# Patient Record
Sex: FEMALE | Race: WHITE | Employment: UNEMPLOYED | URBAN - METROPOLITAN AREA
[De-identification: names, ages, dates, MRNs, and addresses within clinical notes are randomized per-mention and may not be internally consistent; named-entity substitution may affect disease eponyms.]

---

## 2019-08-15 ENCOUNTER — OFFICE VISIT (OUTPATIENT)
Dept: OBGYN CLINIC | Facility: CLINIC | Age: 34
End: 2019-08-15
Payer: COMMERCIAL

## 2019-08-15 ENCOUNTER — TELEPHONE (OUTPATIENT)
Dept: OBGYN CLINIC | Facility: CLINIC | Age: 34
End: 2019-08-15

## 2019-08-15 VITALS
HEART RATE: 70 BPM | SYSTOLIC BLOOD PRESSURE: 106 MMHG | BODY MASS INDEX: 19.33 KG/M2 | DIASTOLIC BLOOD PRESSURE: 68 MMHG | HEIGHT: 64 IN | WEIGHT: 113.25 LBS

## 2019-08-15 DIAGNOSIS — N92.6 MISSED MENSES: Primary | ICD-10-CM

## 2019-08-15 LAB
CONTROL LINE PRESENT WITH A CLEAR BACKGROUND (YES/NO): YES YES/NO
KIT LOT #: NORMAL NUMERIC
PREGNANCY TEST, URINE: POSITIVE

## 2019-08-15 PROCEDURE — 99202 OFFICE O/P NEW SF 15 MIN: CPT | Performed by: ADVANCED PRACTICE MIDWIFE

## 2019-08-15 PROCEDURE — 81025 URINE PREGNANCY TEST: CPT | Performed by: ADVANCED PRACTICE MIDWIFE

## 2019-08-15 NOTE — TELEPHONE ENCOUNTER
pt. requesting to get order to get Ultrasound changed to [facility]. Please call when order is changed.

## 2019-08-15 NOTE — TELEPHONE ENCOUNTER
Spoke w/ Danilo Norman central scheduling. Pt can schedule w/ either Danilo Norman or Marydel. She will need to completely cancel her [place] appt & start a new appt w/ Danilo Norman. A new order is not needed. Called pt & instructed on scheduling.  Pt verbalized an unders

## 2019-08-15 NOTE — PROGRESS NOTES
Maxx Jaquez is a 30year old , current EGA of Unknown presents for amenorrhea. Reports LMP as uncertain with  Irregular cycles and is breastfeeding 3year old  Believes conceived 19.     This is a unplanned pregnancy and patient is excited

## 2019-08-16 ENCOUNTER — TELEPHONE (OUTPATIENT)
Dept: OBGYN CLINIC | Facility: CLINIC | Age: 34
End: 2019-08-16

## 2019-08-16 NOTE — TELEPHONE ENCOUNTER
Pt would like to schedule her Nurse education, didn't see in the notes when she was suppose to return.  Please advise

## 2019-08-16 NOTE — TELEPHONE ENCOUNTER
NAWAF Myrick, RN   Caller: Unspecified (Today,  8:14 AM)             Yes, schedule anytime,and please find out if she signsed ROIs for her previous births at [address] office yesterday.    If not, we need to get those signed at

## 2019-08-17 PROBLEM — O09.299 HISTORY OF POSTPARTUM HEMORRHAGE, CURRENTLY PREGNANT: Status: ACTIVE | Noted: 2019-08-17

## 2019-08-17 PROBLEM — O09.00 PREGNANCY WITH HISTORY OF INFERTILITY, ANTEPARTUM (HCC): Status: ACTIVE | Noted: 2019-08-17

## 2019-08-17 PROBLEM — O09.00 PREGNANCY WITH HISTORY OF INFERTILITY, ANTEPARTUM: Status: ACTIVE | Noted: 2019-08-17

## 2019-08-17 PROBLEM — Z98.891 HISTORY OF VAGINAL BIRTH AFTER CESAREAN: Status: ACTIVE | Noted: 2019-08-17

## 2019-08-17 PROBLEM — O09.299 HISTORY OF POSTPARTUM HEMORRHAGE, CURRENTLY PREGNANT (HCC): Status: ACTIVE | Noted: 2019-08-17

## 2019-08-19 ENCOUNTER — HOSPITAL ENCOUNTER (OUTPATIENT)
Dept: ULTRASOUND IMAGING | Age: 34
Discharge: HOME | End: 2019-08-19
Attending: ADVANCED PRACTICE MIDWIFE
Payer: COMMERCIAL

## 2019-08-19 DIAGNOSIS — N92.6 MISSED MENSES: ICD-10-CM

## 2019-08-19 DIAGNOSIS — N92.6 MISSED PERIODS: ICD-10-CM

## 2019-08-19 PROCEDURE — 76817 TRANSVAGINAL US OBSTETRIC: CPT | Performed by: ADVANCED PRACTICE MIDWIFE

## 2019-08-19 PROCEDURE — 76801 OB US < 14 WKS SINGLE FETUS: CPT | Performed by: ADVANCED PRACTICE MIDWIFE

## 2019-08-19 PROCEDURE — 93975 VASCULAR STUDY: CPT | Performed by: ADVANCED PRACTICE MIDWIFE

## 2019-08-21 ENCOUNTER — NURSE ONLY (OUTPATIENT)
Dept: OBGYN CLINIC | Facility: CLINIC | Age: 34
End: 2019-08-21
Payer: COMMERCIAL

## 2019-08-21 VITALS — WEIGHT: 115.19 LBS | BODY MASS INDEX: 20 KG/M2

## 2019-08-21 DIAGNOSIS — Z3A.12 12 WEEKS GESTATION OF PREGNANCY: Primary | ICD-10-CM

## 2019-08-21 RX ORDER — CHOLECALCIFEROL (VITAMIN D3) 25 MCG
1 TABLET,CHEWABLE ORAL DAILY
COMMUNITY
End: 2019-11-21

## 2019-08-21 NOTE — PROGRESS NOTES
Pt is here today for OB RN education visit, educational material reviewed. Pt verbalized understanding. Orders placed for NOB labs including HCV.  Pt has a hx of  for preeclampsia with twin pregnancy, pt signed DYLON for previous PN records and OP re

## 2019-08-23 ENCOUNTER — TELEPHONE (OUTPATIENT)
Dept: OBGYN CLINIC | Facility: CLINIC | Age: 34
End: 2019-08-23

## 2019-08-26 ENCOUNTER — TELEPHONE (OUTPATIENT)
Dept: OBGYN CLINIC | Facility: CLINIC | Age: 34
End: 2019-08-26

## 2019-08-26 ENCOUNTER — LAB ENCOUNTER (OUTPATIENT)
Dept: LAB | Age: 34
End: 2019-08-26
Attending: ADVANCED PRACTICE MIDWIFE
Payer: COMMERCIAL

## 2019-08-26 DIAGNOSIS — Z3A.12 12 WEEKS GESTATION OF PREGNANCY: ICD-10-CM

## 2019-08-26 LAB
ANTIBODY SCREEN: POSITIVE
BASOPHILS # BLD AUTO: 0.03 X10(3) UL (ref 0–0.2)
BASOPHILS NFR BLD AUTO: 0.3 %
C ANTIGEN: POSITIVE
DEPRECATED RDW RBC AUTO: 42.7 FL (ref 35.1–46.3)
DIRECT COOMBS POLY: NEGATIVE
E ANTIGEN: NEGATIVE
EOSINOPHIL # BLD AUTO: 0.09 X10(3) UL (ref 0–0.7)
EOSINOPHIL NFR BLD AUTO: 0.9 %
ERYTHROCYTE [DISTWIDTH] IN BLOOD BY AUTOMATED COUNT: 14.3 % (ref 11–15)
EST. AVERAGE GLUCOSE BLD GHB EST-MCNC: 100 MG/DL (ref 68–126)
FOLATE SERPL-MCNC: >20 NG/ML (ref 8.7–?)
HBA1C MFR BLD HPLC: 5.1 % (ref ?–5.7)
HBV SURFACE AG SER-ACNC: 0.25 [IU]/L
HBV SURFACE AG SERPL QL IA: NONREACTIVE
HCT VFR BLD AUTO: 38.2 % (ref 35–48)
HCV AB SERPL QL IA: NONREACTIVE
HGB BLD-MCNC: 12.8 G/DL (ref 12–16)
IMM GRANULOCYTES # BLD AUTO: 0.04 X10(3) UL (ref 0–1)
IMM GRANULOCYTES NFR BLD: 0.4 %
LITTLE C ANTIGEN: NEGATIVE
LITTLE E ANTIGEN: POSITIVE
LYMPHOCYTES # BLD AUTO: 1.89 X10(3) UL (ref 1–4)
LYMPHOCYTES NFR BLD AUTO: 18.3 %
MCH RBC QN AUTO: 27.5 PG (ref 26–34)
MCHC RBC AUTO-ENTMCNC: 33.5 G/DL (ref 31–37)
MCV RBC AUTO: 82 FL (ref 80–100)
MONOCYTES # BLD AUTO: 0.56 X10(3) UL (ref 0.1–1)
MONOCYTES NFR BLD AUTO: 5.4 %
NEUTROPHILS # BLD AUTO: 7.73 X10 (3) UL (ref 1.5–7.7)
NEUTROPHILS # BLD AUTO: 7.73 X10(3) UL (ref 1.5–7.7)
NEUTROPHILS NFR BLD AUTO: 74.7 %
PLATELET # BLD AUTO: 264 10(3)UL (ref 150–450)
RBC # BLD AUTO: 4.66 X10(6)UL (ref 3.8–5.3)
RH BLOOD TYPE: POSITIVE
RUBV IGG SER QL: POSITIVE
RUBV IGG SER-ACNC: 62.4 IU/ML (ref 10–?)
VIT B12 SERPL-MCNC: 590 PG/ML (ref 193–986)
WBC # BLD AUTO: 10.3 X10(3) UL (ref 4–11)

## 2019-08-26 PROCEDURE — 86850 RBC ANTIBODY SCREEN: CPT

## 2019-08-26 PROCEDURE — 82607 VITAMIN B-12: CPT

## 2019-08-26 PROCEDURE — 86886 COOMBS TEST INDIRECT TITER: CPT

## 2019-08-26 PROCEDURE — 83036 HEMOGLOBIN GLYCOSYLATED A1C: CPT

## 2019-08-26 PROCEDURE — 86803 HEPATITIS C AB TEST: CPT

## 2019-08-26 PROCEDURE — 86077 PHYS BLOOD BANK SERV XMATCH: CPT

## 2019-08-26 PROCEDURE — 86905 BLOOD TYPING RBC ANTIGENS: CPT

## 2019-08-26 PROCEDURE — 36415 COLL VENOUS BLD VENIPUNCTURE: CPT

## 2019-08-26 PROCEDURE — 86901 BLOOD TYPING SEROLOGIC RH(D): CPT

## 2019-08-26 PROCEDURE — 86880 COOMBS TEST DIRECT: CPT

## 2019-08-26 PROCEDURE — 86870 RBC ANTIBODY IDENTIFICATION: CPT

## 2019-08-26 PROCEDURE — 86900 BLOOD TYPING SEROLOGIC ABO: CPT

## 2019-08-26 PROCEDURE — 85025 COMPLETE CBC W/AUTO DIFF WBC: CPT

## 2019-08-26 PROCEDURE — 87086 URINE CULTURE/COLONY COUNT: CPT

## 2019-08-26 PROCEDURE — 87389 HIV-1 AG W/HIV-1&-2 AB AG IA: CPT

## 2019-08-26 PROCEDURE — 86762 RUBELLA ANTIBODY: CPT

## 2019-08-26 PROCEDURE — 86780 TREPONEMA PALLIDUM: CPT

## 2019-08-26 PROCEDURE — 82746 ASSAY OF FOLIC ACID SERUM: CPT

## 2019-08-26 PROCEDURE — 87340 HEPATITIS B SURFACE AG IA: CPT

## 2019-08-26 NOTE — TELEPHONE ENCOUNTER
Charley Griffin, supervisor of the Blood Bank called. Pt has a Positive Antibody Screen. Zeke Michaels wanted to know if pt has a history of transfusion. Zeke Michaels was advised the pt does have a history of blood transfusion PP at [facility].   Zeke Michaels will f/u

## 2019-08-27 ENCOUNTER — TELEPHONE (OUTPATIENT)
Dept: OBGYN CLINIC | Facility: CLINIC | Age: 34
End: 2019-08-27

## 2019-08-27 DIAGNOSIS — O36.1910 MATERNAL ATYPICAL ANTIBODY AFFECTING PREGNANCY IN FIRST TRIMESTER, SINGLE OR UNSPECIFIED FETUS: Primary | ICD-10-CM

## 2019-08-27 DIAGNOSIS — O09.629 SUPERVISION OF HIGH-RISK PREGNANCY OF YOUNG MULTIGRAVIDA: ICD-10-CM

## 2019-08-27 PROBLEM — R76.0 ABNORMAL ANTIBODY TITER: Status: ACTIVE | Noted: 2019-08-27

## 2019-08-27 PROBLEM — Z87.828: Status: ACTIVE | Noted: 2019-08-27

## 2019-08-27 NOTE — TELEPHONE ENCOUNTER
TC to patient. Rev antibody results. 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 Pt did have a transfusion after last delivery.   Advised I would like her to see [facility] for consult and

## 2019-08-27 NOTE — TELEPHONE ENCOUNTER
Ether Jennifer from blood bank needs to discuss antibody screen results with BR. Latosha Rodriguez requested BR to call her at extension [phone]. Message routed to DIMITRIOS.

## 2019-08-28 LAB — T PALLIDUM AB SER QL: NEGATIVE

## 2019-08-29 ENCOUNTER — HOSPITAL ENCOUNTER (OUTPATIENT)
Dept: PERINATAL CARE | Facility: HOSPITAL | Age: 34
Discharge: HOME | End: 2019-08-29
Attending: ADVANCED PRACTICE MIDWIFE
Payer: COMMERCIAL

## 2019-08-29 ENCOUNTER — HOSPITAL ENCOUNTER (OUTPATIENT)
Dept: PERINATAL CARE | Facility: HOSPITAL | Age: 34
Discharge: HOME | End: 2019-08-29
Attending: OBSTETRICS & GYNECOLOGY
Payer: COMMERCIAL

## 2019-08-29 VITALS
DIASTOLIC BLOOD PRESSURE: 78 MMHG | SYSTOLIC BLOOD PRESSURE: 120 MMHG | HEIGHT: 64 IN | HEART RATE: 76 BPM | WEIGHT: 115 LBS | BODY MASS INDEX: 19.63 KG/M2

## 2019-08-29 DIAGNOSIS — R76.0 ABNORMAL ANTIBODY TITER: ICD-10-CM

## 2019-08-29 DIAGNOSIS — Z36.9 FIRST TRIMESTER SCREENING: ICD-10-CM

## 2019-08-29 DIAGNOSIS — Z36.9 FIRST TRIMESTER SCREENING: Primary | ICD-10-CM

## 2019-08-29 DIAGNOSIS — O36.1910 MATERNAL ATYPICAL ANTIBODY AFFECTING PREGNANCY IN FIRST TRIMESTER, SINGLE OR UNSPECIFIED FETUS: ICD-10-CM

## 2019-08-29 PROCEDURE — 76813 OB US NUCHAL MEAS 1 GEST: CPT | Performed by: OBSTETRICS & GYNECOLOGY

## 2019-08-29 PROCEDURE — 99244 OFF/OP CNSLTJ NEW/EST MOD 40: CPT | Performed by: OBSTETRICS & GYNECOLOGY

## 2019-08-29 NOTE — TELEPHONE ENCOUNTER
Pt just saw MFM & was told she will need frequent u/s. Note not completed in Epic yet. Pt was told by BR that tomorrows NOB appt might not be advisable as she may risk out of midwifery care, pending MFM recs. Pt states she is unsure of what to do.  Advised

## 2019-08-29 NOTE — PROGRESS NOTES
Reason for Consult:   Dear Ms. Fish,    Thank you for requesting ultrasound evaluation and maternal fetal medicine consultation on [address]. As you are aware she is a 30year old female with a Chiang pregnancy at 13w5d.   A maternal-feta Transfusion history     PP      Past Surgical History:   Procedure Laterality Date   •      • LAPAROSCOPY,PELVIC,BIOPSY        No family history on file.    Social History    Tobacco Use      Smoking status: Never Smoker      Smokeless tobacco: Nev affected, subsequent pregnancies tend to be affected earlier and more severe. Paternal zygosity of the antigens is determined using quantitative polymerase chain reaction (PCR) to identify the number of RHD genes.             The father of the baby s of anemia. In the past, amniocentesis to determine amniotic fluid bilirubin levels was the usual method for indirectly estimating the severity of fetal anemia.   However, Doppler velocimetry is a more sensitive and specific test for detection of No fetal structural abnormalities seen but limited by early gestational age  3. Anti-c and anti-E antibodies    RECOMMENDATIONS:     1. Follow maternal titers of 1:8 or less every month until 24 weeks, then every two  weeks.        OBTAIN TITER ON ANTI-c

## 2019-08-29 NOTE — TELEPHONE ENCOUNTER
MFM note reviewed per BR. Advised pt should review w/ MJ tomorrow at [facility] & make collective decision.   Pt verbalized an understanding & agrees w/ plan

## 2019-08-30 ENCOUNTER — TELEPHONE (OUTPATIENT)
Dept: OBGYN CLINIC | Facility: CLINIC | Age: 34
End: 2019-08-30

## 2019-08-30 ENCOUNTER — INITIAL PRENATAL (OUTPATIENT)
Dept: OBGYN CLINIC | Facility: CLINIC | Age: 34
End: 2019-08-30
Payer: COMMERCIAL

## 2019-08-30 VITALS
SYSTOLIC BLOOD PRESSURE: 113 MMHG | BODY MASS INDEX: 20 KG/M2 | WEIGHT: 115 LBS | HEART RATE: 69 BPM | DIASTOLIC BLOOD PRESSURE: 78 MMHG

## 2019-08-30 DIAGNOSIS — N81.89 PELVIC FLOOR WEAKNESS IN FEMALE: ICD-10-CM

## 2019-08-30 DIAGNOSIS — O34.219 PREVIOUS CESAREAN DELIVERY AFFECTING PREGNANCY: ICD-10-CM

## 2019-08-30 DIAGNOSIS — Z34.81 ENCOUNTER FOR SUPERVISION OF OTHER NORMAL PREGNANCY IN FIRST TRIMESTER: Primary | ICD-10-CM

## 2019-08-30 LAB
APPEARANCE: CLEAR
MULTISTIX LOT#: NORMAL NUMERIC
PH, URINE: 6 (ref 4.5–8)
SPECIFIC GRAVITY: 1.01 (ref 1–1.03)
URINE-COLOR: YELLOW
UROBILINOGEN,SEMI-QN: 0.2 MG/DL (ref 0–1.9)

## 2019-08-30 PROCEDURE — 81002 URINALYSIS NONAUTO W/O SCOPE: CPT | Performed by: ADVANCED PRACTICE MIDWIFE

## 2019-08-30 NOTE — TELEPHONE ENCOUNTER
Please let pt know I have placed order for antigens for her . I have spoke with Robert Doyle in the blood bank and she confirmed this is the correct order. She recommends he does come to our lab to have it done. If he is positive the baby is at risk.  Richardson Goodpasture

## 2019-08-30 NOTE — TELEPHONE ENCOUNTER
Per MD, RN to call and offer pt return ob appt in 2 weeks as continuity of care.      RN  Placed call to pt and scheduled her for New OB with MLM on 9/11/19

## 2019-08-30 NOTE — TELEPHONE ENCOUNTER
Pt seen by midwife practice, last office visit today. RN routing to [facility] for consideration. Please advise if we can proceed with scheduling patient.

## 2019-08-30 NOTE — TELEPHONE ENCOUNTER
PER PT REQUESTING TO TRANSFER TO THIS GROUP DR SUZANNE BROWN/ SHE WAS SEE AT THE MIDWIFERY GROUP TODAY /   PLS ADV

## 2019-08-30 NOTE — PROGRESS NOTES
Here as NOB. Hx of c/s with twins for pre-eclampsia. 2nd baby , traumatic birth with extensive lacerations and PPH requiring blood transfusion. Pushed for a long time. Reports things have not been right since that delivery.  Reports significant lacerati

## 2019-08-31 NOTE — TELEPHONE ENCOUNTER
Notified pt of MJ instructions. Pt states  will be back in town in Oct & will complete then. Pt has transferred care to [facility].

## 2019-09-01 LAB
C TRACH DNA SPEC QL NAA+PROBE: NEGATIVE
N GONORRHOEA DNA SPEC QL NAA+PROBE: NEGATIVE

## 2019-09-02 PROBLEM — Z98.891 HISTORY OF CESAREAN DELIVERY: Status: ACTIVE | Noted: 2019-09-02

## 2019-09-11 ENCOUNTER — INITIAL PRENATAL (OUTPATIENT)
Dept: OBGYN CLINIC | Facility: CLINIC | Age: 34
End: 2019-09-11
Payer: COMMERCIAL

## 2019-09-11 VITALS
SYSTOLIC BLOOD PRESSURE: 106 MMHG | BODY MASS INDEX: 20 KG/M2 | WEIGHT: 117 LBS | HEART RATE: 75 BPM | DIASTOLIC BLOOD PRESSURE: 69 MMHG

## 2019-09-11 DIAGNOSIS — Z34.82 ENCOUNTER FOR SUPERVISION OF OTHER NORMAL PREGNANCY IN SECOND TRIMESTER: Primary | ICD-10-CM

## 2019-09-11 LAB
MULTISTIX LOT#: NORMAL NUMERIC
PH, URINE: 6.5 (ref 4.5–8)
SPECIFIC GRAVITY: 1.02 (ref 1–1.03)
UROBILINOGEN,SEMI-QN: 0.2 MG/DL (ref 0–1.9)

## 2019-09-11 PROCEDURE — 81002 URINALYSIS NONAUTO W/O SCOPE: CPT | Performed by: OBSTETRICS & GYNECOLOGY

## 2019-09-11 NOTE — PROGRESS NOTES
To do Antibody screen monthly until 24 weeks then every two weeks. OB U/S x 4 weeks. Desires Repeat  @ 39 weeks. Declines all forms of contraception.

## 2019-09-12 ENCOUNTER — PATIENT MESSAGE (OUTPATIENT)
Dept: OBGYN CLINIC | Facility: CLINIC | Age: 34
End: 2019-09-12

## 2019-09-16 ENCOUNTER — NURSE ONLY (OUTPATIENT)
Dept: OBGYN CLINIC | Facility: CLINIC | Age: 34
End: 2019-09-16
Payer: COMMERCIAL

## 2019-09-16 ENCOUNTER — TELEPHONE (OUTPATIENT)
Dept: OBGYN CLINIC | Facility: CLINIC | Age: 34
End: 2019-09-16

## 2019-09-16 VITALS — WEIGHT: 119 LBS | HEIGHT: 64 IN | BODY MASS INDEX: 20.32 KG/M2

## 2019-09-16 DIAGNOSIS — Z34.82 ENCOUNTER FOR SUPERVISION OF OTHER NORMAL PREGNANCY IN SECOND TRIMESTER: Primary | ICD-10-CM

## 2019-09-16 PROBLEM — R76.0 ABNORMAL ANTIBODY TITER: Status: RESOLVED | Noted: 2019-08-27 | Resolved: 2019-09-16

## 2019-09-16 PROBLEM — Z87.828: Status: RESOLVED | Noted: 2019-08-27 | Resolved: 2019-09-16

## 2019-09-16 NOTE — TELEPHONE ENCOUNTER
MARIA EUGENIA was informed that the pt is [place] and she is a carrier for sickle cell per pt. MARIA EUGENIA stated that he wants her to have a hemoglobin electrophoresis. Called lab and they stated the code is [id number], hemoglobinopathy profile. Sent to MARIA EUGENIA as a FYI.

## 2019-09-16 NOTE — PROGRESS NOTES
Pt seen for OBN appt today with no complaints. Normal PN labs ordered with midwives. Pt advised all labs must be completed and resulted prior to MD appt. Pt walked to  to schedule NPN appt with MD.    Transfer from Midwives.  OB U/S in the com PPH       INFECTION HISTORY    Chlamydia No    Pt or partner have hx of Genital Herpes No    Gonorrhea No    Hepatitis B No    HIV No    HPV No    MRSA No    Syphilis No    Tattoos No    Live with someone or Exposed to TB No    Rash or viral illness since

## 2019-09-16 NOTE — TELEPHONE ENCOUNTER
Pt. Is 16 wks requesting to transfer care to our office. Pt. Informed that we have 6 providers ( 2 male and 4 female) and she will have to see all of them at least once during her pregnancy. Pt. Agreeable.  OBn appt scheduled for today at 2pm

## 2019-09-16 NOTE — TELEPHONE ENCOUNTER
Pt is 16wks. Pt was originally being seen by midwives but was then referred to [facility] but pt did not like her experience and would like to be seen by [facility].  Please advise

## 2019-09-16 NOTE — TELEPHONE ENCOUNTER
Pt is a transfer of care to us from midwives. Pt states that she had gest diab with the twins only, not her other pregnancy. Pt stating that midwives said she would do the 1 hr gtt at 24-26 weeks, not with her initial prenatal workup.     Do you want th

## 2019-09-16 NOTE — TELEPHONE ENCOUNTER
Any  descent patient needs Sickle prep. I would not ask partner to do it unless patient is positive.

## 2019-09-16 NOTE — TELEPHONE ENCOUNTER
Pt transferred from Midwives to our group. Pt states that she had a history of preeclampsia with one of her pregnancies. Do not see that she did the 1 hr gtt. Pt states that she is a carrier of sickle cell and that her  is not a carrier.

## 2019-09-17 NOTE — TELEPHONE ENCOUNTER
Pt informed of Jin's recs. Pt. States that she has had 4 pregnancies and has never been asked to to this type of bloodwork. Pt. Informed that she could wait until her 9/26 appt with SADIE to discuss this prior to having lab work done.  Pt. Verbalized Vale Arvizu

## 2019-09-18 ENCOUNTER — TELEPHONE (OUTPATIENT)
Dept: OBGYN CLINIC | Facility: CLINIC | Age: 34
End: 2019-09-18

## 2019-09-23 ENCOUNTER — TELEPHONE (OUTPATIENT)
Dept: OBGYN CLINIC | Facility: CLINIC | Age: 34
End: 2019-09-23

## 2019-09-23 NOTE — TELEPHONE ENCOUNTER
Pt is a transfer from the midwives and [facility]. She is now seeing us for her pregnancy. (See 8/26 and 8/27 phone encounters.) Pt had a positive antibody screen. Pt does have a history of a blood transfusion. Pt reported she had anti-E with her last pregnancy. Pt had blood tests done here. Per MFM at 8/29/19 visit, results show anti-c and anti-E. Per Neha Abdi at the blood bank, they test the FOB and he was positive for one of the antibodies. Neha Abdi could not given me FOB's results as no DYLON is signed. She just wanted SADIE aware of this at pt's NPN appt on 9/26/19. Message to [address] [place] [address] as [phone] Nilay Murphy.

## 2019-09-24 ENCOUNTER — TELEPHONE (OUTPATIENT)
Dept: OBGYN CLINIC | Facility: CLINIC | Age: 34
End: 2019-09-24

## 2019-09-24 NOTE — TELEPHONE ENCOUNTER
Please call pt and let her know that her  did come back + for little e and little c antigens. I have forwarded that info to [facility] as well.  Thanks MJ

## 2019-09-26 ENCOUNTER — INITIAL PRENATAL (OUTPATIENT)
Dept: OBGYN CLINIC | Facility: CLINIC | Age: 34
End: 2019-09-26
Payer: COMMERCIAL

## 2019-09-26 ENCOUNTER — LAB ENCOUNTER (OUTPATIENT)
Dept: LAB | Facility: HOSPITAL | Age: 34
End: 2019-09-26
Attending: OBSTETRICS & GYNECOLOGY
Payer: COMMERCIAL

## 2019-09-26 VITALS
SYSTOLIC BLOOD PRESSURE: 104 MMHG | HEART RATE: 71 BPM | DIASTOLIC BLOOD PRESSURE: 70 MMHG | WEIGHT: 123.19 LBS | BODY MASS INDEX: 21 KG/M2

## 2019-09-26 DIAGNOSIS — Z34.82 ENCOUNTER FOR SUPERVISION OF OTHER NORMAL PREGNANCY IN SECOND TRIMESTER: ICD-10-CM

## 2019-09-26 DIAGNOSIS — Z34.92 ENCOUNTER FOR SUPERVISION OF NORMAL PREGNANCY IN SECOND TRIMESTER, UNSPECIFIED GRAVIDITY: Primary | ICD-10-CM

## 2019-09-26 DIAGNOSIS — K62.3 RECTAL PROLAPSE: ICD-10-CM

## 2019-09-26 LAB
ANTIBODY SCREEN: POSITIVE
DIRECT COOMBS POLY: NEGATIVE

## 2019-09-26 PROCEDURE — 86870 RBC ANTIBODY IDENTIFICATION: CPT

## 2019-09-26 PROCEDURE — 81002 URINALYSIS NONAUTO W/O SCOPE: CPT | Performed by: OBSTETRICS & GYNECOLOGY

## 2019-09-26 PROCEDURE — 86880 COOMBS TEST DIRECT: CPT

## 2019-09-26 PROCEDURE — 86077 PHYS BLOOD BANK SERV XMATCH: CPT

## 2019-09-26 PROCEDURE — 86850 RBC ANTIBODY SCREEN: CPT

## 2019-09-26 PROCEDURE — 83021 HEMOGLOBIN CHROMOTOGRAPHY: CPT

## 2019-09-26 PROCEDURE — 85660 RBC SICKLE CELL TEST: CPT

## 2019-09-26 PROCEDURE — 36415 COLL VENOUS BLD VENIPUNCTURE: CPT

## 2019-09-26 PROCEDURE — 86886 COOMBS TEST INDIRECT TITER: CPT

## 2019-09-26 PROCEDURE — 83020 HEMOGLOBIN ELECTROPHORESIS: CPT

## 2019-09-26 RX ORDER — DOCONEXENT, NIACINAMIDE, .ALPHA.-TOCOPHEROL ACETATE, DL-, CHOLECALCIFEROL, .BETA.-CAROTENE, ASCORBIC ACID, THIAMINE MONONITRATE, RIBOFLAVIN, PYRIDOXINE HYDROCHLORIDE, CYANOCOBALAMIN, IRON, ZINC OXIDE, CUPRIC OXIDE, POTASSIUM IODIDE, MAGNESIUM OXIDE, FOLIC ACID, AND LEVOMEFOLATE CALCIUM 200; 15; 20; 1000; 1100; 30; 1.6; 1.8; 2.5; 12; 29; 25; 2; 150; 20; .4; .6 MG/1; MG/1; [IU]/1; [IU]/1; [IU]/1; MG/1; MG/1; MG/1; MG/1; UG/1; MG/1; MG/1; MG/1; UG/1; MG/1; MG/1; MG/1
1 CAPSULE, LIQUID FILLED ORAL
COMMUNITY
Start: 2014-06-09

## 2019-10-07 ENCOUNTER — TELEPHONE (OUTPATIENT)
Dept: OBGYN CLINIC | Facility: CLINIC | Age: 34
End: 2019-10-07

## 2019-10-07 DIAGNOSIS — Z86.32 HISTORY OF GESTATIONAL DIABETES: Primary | ICD-10-CM

## 2019-10-07 DIAGNOSIS — D57.3 SICKLE CELL TRAIT (HCC): ICD-10-CM

## 2019-10-07 PROBLEM — Z34.90 PREGNANCY (HCC): Status: ACTIVE | Noted: 2019-10-07

## 2019-10-07 PROBLEM — Z34.90 PREGNANCY: Status: ACTIVE | Noted: 2019-10-07

## 2019-10-07 PROBLEM — O09.292 HX OF PREECLAMPSIA, PRIOR PREGNANCY, CURRENTLY PREGNANT, SECOND TRIMESTER: Status: ACTIVE | Noted: 2019-10-07

## 2019-10-07 PROBLEM — O09.292 HX OF PREECLAMPSIA, PRIOR PREGNANCY, CURRENTLY PREGNANT, SECOND TRIMESTER (HCC): Status: ACTIVE | Noted: 2019-10-07

## 2019-10-07 PROBLEM — R76.0 ELEVATED ANTIBODY LEVELS: Status: ACTIVE | Noted: 2019-10-07

## 2019-10-07 NOTE — TELEPHONE ENCOUNTER
Her note was completed earlier today. An AVS should be able to be generated. I was out of town as my father had surgery and I'm catching up on charting.

## 2019-10-07 NOTE — PROGRESS NOTES
New OB. Transfer of care. Started with midwife group then switched to [facility] group. No complaints today. Declined genetics. History of preE and GDM with twin pregnancy. No issues with subsequent pregnancy from antepartum standpoint.  Refused early 1h

## 2019-10-07 NOTE — TELEPHONE ENCOUNTER
Please let patient know I'd like her to have level 2 with MFM. I'll coordinate US referral.  Dionte Friedman will call her with info to schedule. Thanks!

## 2019-10-07 NOTE — TELEPHONE ENCOUNTER
Sent to [address] [place] [address], ok for 20 week ob u/s. Do you want it at [place] done?

## 2019-10-07 NOTE — TELEPHONE ENCOUNTER
Pt informed of SADIE's recs and advised Jenna should be contacting her. Pt asked how long it [facility] takes [facility] Pt advised it all depends on when the doctor does it.   Pt states she was seen 2 Thursdays ago and sin

## 2019-10-21 ENCOUNTER — APPOINTMENT (OUTPATIENT)
Dept: PHYSICAL THERAPY | Facility: HOSPITAL | Age: 34
End: 2019-10-21
Attending: ADVANCED PRACTICE MIDWIFE
Payer: COMMERCIAL

## 2019-10-23 ENCOUNTER — HOSPITAL ENCOUNTER (OUTPATIENT)
Dept: PERINATAL CARE | Facility: HOSPITAL | Age: 34
Discharge: HOME | End: 2019-10-23
Attending: OBSTETRICS & GYNECOLOGY
Payer: COMMERCIAL

## 2019-10-23 VITALS
DIASTOLIC BLOOD PRESSURE: 64 MMHG | HEART RATE: 71 BPM | WEIGHT: 130 LBS | SYSTOLIC BLOOD PRESSURE: 104 MMHG | BODY MASS INDEX: 22 KG/M2

## 2019-10-23 DIAGNOSIS — D57.3 SICKLE CELL TRAIT (HCC): ICD-10-CM

## 2019-10-23 DIAGNOSIS — O09.292 HX OF PREECLAMPSIA, PRIOR PREGNANCY, CURRENTLY PREGNANT, SECOND TRIMESTER: ICD-10-CM

## 2019-10-23 DIAGNOSIS — O09.292 HX OF PREECLAMPSIA, PRIOR PREGNANCY, CURRENTLY PREGNANT, SECOND TRIMESTER: Primary | ICD-10-CM

## 2019-10-23 DIAGNOSIS — R76.0 ELEVATED ANTIBODY LEVELS: ICD-10-CM

## 2019-10-23 DIAGNOSIS — Z86.32 HISTORY OF GESTATIONAL DIABETES: ICD-10-CM

## 2019-10-23 DIAGNOSIS — O36.1920 MATERNAL ATYPICAL ANTIBODY AFFECTING PREGNANCY IN SECOND TRIMESTER, SINGLE OR UNSPECIFIED FETUS: ICD-10-CM

## 2019-10-23 PROCEDURE — 99214 OFFICE O/P EST MOD 30 MIN: CPT | Performed by: OBSTETRICS & GYNECOLOGY

## 2019-10-23 PROCEDURE — 76811 OB US DETAILED SNGL FETUS: CPT | Performed by: OBSTETRICS & GYNECOLOGY

## 2019-10-23 NOTE — PROGRESS NOTES
Reason for Consult:   Dear Ms. Fish,    Thank you for requesting ultrasound evaluation and maternal fetal medicine consultation on [address]. As you are aware she is a 30year old female with a Chiang pregnancy.   A maternal-fetal medicin Diabetes mellitus ([facility] [address]     Gestational diabetes with 1st pregnancy   • Dysmenorrhea    • Endometriosis    • Gestational diabetes    • Hyperlipidemia    • Infertility, female     Did a frozen embryo transfer   • Partial rectal prolapse     partial rectal p face, spine, neck, skin, chest, abdominal wall, gastrointestinal tract, kidneys, bladder, extremities.    Brain: Visualized and normal appearances: brain parenchyma, cerebral ventricles, choroid plexus, Cisterna Magna, midline falx, cerebellum, cerebellar l

## 2019-10-25 ENCOUNTER — LAB ENCOUNTER (OUTPATIENT)
Dept: LAB | Facility: HOSPITAL | Age: 34
End: 2019-10-25
Attending: OBSTETRICS & GYNECOLOGY
Payer: COMMERCIAL

## 2019-10-25 ENCOUNTER — ROUTINE PRENATAL (OUTPATIENT)
Dept: OBGYN CLINIC | Facility: CLINIC | Age: 34
End: 2019-10-25
Payer: COMMERCIAL

## 2019-10-25 VITALS
HEART RATE: 75 BPM | WEIGHT: 130 LBS | SYSTOLIC BLOOD PRESSURE: 101 MMHG | BODY MASS INDEX: 22 KG/M2 | DIASTOLIC BLOOD PRESSURE: 63 MMHG

## 2019-10-25 DIAGNOSIS — R76.0 ABNORMAL ANTIBODY TITER: ICD-10-CM

## 2019-10-25 DIAGNOSIS — Z34.82 ENCOUNTER FOR SUPERVISION OF OTHER NORMAL PREGNANCY IN SECOND TRIMESTER: Primary | ICD-10-CM

## 2019-10-25 PROCEDURE — 86870 RBC ANTIBODY IDENTIFICATION: CPT

## 2019-10-25 PROCEDURE — 36415 COLL VENOUS BLD VENIPUNCTURE: CPT

## 2019-10-25 PROCEDURE — 86077 PHYS BLOOD BANK SERV XMATCH: CPT

## 2019-10-25 PROCEDURE — 86850 RBC ANTIBODY SCREEN: CPT

## 2019-10-25 PROCEDURE — 81002 URINALYSIS NONAUTO W/O SCOPE: CPT | Performed by: OBSTETRICS & GYNECOLOGY

## 2019-10-25 PROCEDURE — 86886 COOMBS TEST INDIRECT TITER: CPT

## 2019-10-25 PROCEDURE — 86880 COOMBS TEST DIRECT: CPT

## 2019-10-25 NOTE — PROGRESS NOTES
Declined flu shot. Going to pelvic PT at [facility]. Order for antibody screen.   RTC 4 wk

## 2019-10-29 ENCOUNTER — APPOINTMENT (OUTPATIENT)
Dept: PHYSICAL THERAPY | Facility: HOSPITAL | Age: 34
End: 2019-10-29
Attending: ADVANCED PRACTICE MIDWIFE
Payer: COMMERCIAL

## 2019-11-05 ENCOUNTER — APPOINTMENT (OUTPATIENT)
Dept: PHYSICAL THERAPY | Facility: HOSPITAL | Age: 34
End: 2019-11-05
Attending: ADVANCED PRACTICE MIDWIFE
Payer: COMMERCIAL

## 2019-11-12 ENCOUNTER — APPOINTMENT (OUTPATIENT)
Dept: PHYSICAL THERAPY | Facility: HOSPITAL | Age: 34
End: 2019-11-12
Attending: ADVANCED PRACTICE MIDWIFE
Payer: COMMERCIAL

## 2019-11-14 ENCOUNTER — TELEPHONE (OUTPATIENT)
Dept: OBGYN CLINIC | Facility: CLINIC | Age: 34
End: 2019-11-14

## 2019-11-14 NOTE — TELEPHONE ENCOUNTER
Pt consult note from Dangelo Walton DPT dated 10/1/19 received via fax and placed on [employer] desk for review.

## 2019-11-15 NOTE — TELEPHONE ENCOUNTER
Signed consult note from [address] [place] [address] faxed back to Cristian Arevalo DPT. Signed consult note sent to scanning.

## 2019-11-19 ENCOUNTER — APPOINTMENT (OUTPATIENT)
Dept: PHYSICAL THERAPY | Facility: HOSPITAL | Age: 34
End: 2019-11-19
Attending: ADVANCED PRACTICE MIDWIFE
Payer: COMMERCIAL

## 2019-11-21 ENCOUNTER — ROUTINE PRENATAL (OUTPATIENT)
Dept: OBGYN CLINIC | Facility: CLINIC | Age: 34
End: 2019-11-21
Payer: COMMERCIAL

## 2019-11-21 VITALS
HEART RATE: 78 BPM | SYSTOLIC BLOOD PRESSURE: 100 MMHG | DIASTOLIC BLOOD PRESSURE: 65 MMHG | BODY MASS INDEX: 23 KG/M2 | WEIGHT: 136.81 LBS

## 2019-11-21 DIAGNOSIS — Z34.92 ENCOUNTER FOR SUPERVISION OF NORMAL PREGNANCY IN SECOND TRIMESTER, UNSPECIFIED GRAVIDITY: Primary | ICD-10-CM

## 2019-11-21 PROCEDURE — 81002 URINALYSIS NONAUTO W/O SCOPE: CPT | Performed by: OBSTETRICS & GYNECOLOGY

## 2019-11-21 NOTE — PROGRESS NOTES
Labs ordered, will do titers today, sees PT for pelvic floor PT due to incomplete uterine prolapse, still deciding about

## 2019-11-26 ENCOUNTER — APPOINTMENT (OUTPATIENT)
Dept: PHYSICAL THERAPY | Facility: HOSPITAL | Age: 34
End: 2019-11-26
Attending: ADVANCED PRACTICE MIDWIFE
Payer: COMMERCIAL

## 2019-12-03 ENCOUNTER — LAB ENCOUNTER (OUTPATIENT)
Dept: LAB | Facility: HOSPITAL | Age: 34
End: 2019-12-03
Attending: OBSTETRICS & GYNECOLOGY
Payer: COMMERCIAL

## 2019-12-03 ENCOUNTER — APPOINTMENT (OUTPATIENT)
Dept: PHYSICAL THERAPY | Facility: HOSPITAL | Age: 34
End: 2019-12-03
Attending: ADVANCED PRACTICE MIDWIFE
Payer: COMMERCIAL

## 2019-12-03 DIAGNOSIS — R76.0 ABNORMAL ANTIBODY TITER: ICD-10-CM

## 2019-12-03 DIAGNOSIS — Z34.92 ENCOUNTER FOR SUPERVISION OF NORMAL PREGNANCY IN SECOND TRIMESTER, UNSPECIFIED GRAVIDITY: ICD-10-CM

## 2019-12-03 PROCEDURE — 86077 PHYS BLOOD BANK SERV XMATCH: CPT

## 2019-12-03 PROCEDURE — 86850 RBC ANTIBODY SCREEN: CPT

## 2019-12-03 PROCEDURE — 82950 GLUCOSE TEST: CPT

## 2019-12-03 PROCEDURE — 86870 RBC ANTIBODY IDENTIFICATION: CPT

## 2019-12-03 PROCEDURE — 86880 COOMBS TEST DIRECT: CPT

## 2019-12-03 PROCEDURE — 86886 COOMBS TEST INDIRECT TITER: CPT

## 2019-12-03 PROCEDURE — 85027 COMPLETE CBC AUTOMATED: CPT

## 2019-12-03 PROCEDURE — 36415 COLL VENOUS BLD VENIPUNCTURE: CPT

## 2019-12-05 ENCOUNTER — TELEPHONE (OUTPATIENT)
Dept: OBGYN CLINIC | Facility: CLINIC | Age: 34
End: 2019-12-05

## 2019-12-05 ENCOUNTER — ROUTINE PRENATAL (OUTPATIENT)
Dept: OBGYN CLINIC | Facility: CLINIC | Age: 34
End: 2019-12-05
Payer: COMMERCIAL

## 2019-12-05 VITALS
HEART RATE: 71 BPM | DIASTOLIC BLOOD PRESSURE: 63 MMHG | WEIGHT: 138.19 LBS | BODY MASS INDEX: 24 KG/M2 | SYSTOLIC BLOOD PRESSURE: 99 MMHG

## 2019-12-05 DIAGNOSIS — Z34.83 ENCOUNTER FOR SUPERVISION OF OTHER NORMAL PREGNANCY IN THIRD TRIMESTER: Primary | ICD-10-CM

## 2019-12-05 DIAGNOSIS — R73.09 ELEVATED GLUCOSE TOLERANCE TEST: Primary | ICD-10-CM

## 2019-12-05 PROCEDURE — 81002 URINALYSIS NONAUTO W/O SCOPE: CPT | Performed by: OBSTETRICS & GYNECOLOGY

## 2019-12-05 NOTE — TELEPHONE ENCOUNTER
I spoke with Regan Dodson at the Blood Bank and she states she sees the result of the titers but she does not see the work up. Regan Dodson states she will call us back in a couple of hours.     Regan Dodson from the Blood Bank called back and stated the results are in the comput

## 2019-12-05 NOTE — PROGRESS NOTES
No complaints. Reviewed needs 3 hour GTT. Still undecided on CS vs . Needs growth US at 32 weeks.    RTC 2 wks

## 2019-12-05 NOTE — TELEPHONE ENCOUNTER
Please call lab to see if titers are pending. We need them. Please also call pt with 3 hour GTT instructions and number to call to schedule.  She will need order placed

## 2019-12-05 NOTE — TELEPHONE ENCOUNTER
I spoke with pt and advised her for the 3 Hr GTT she will need to go to the lab fasting for 12 hrs from the night before. Pt may have sips of water but nothing to eat or drink. Pt will need appt at the lab and she was given contact information.  Pt was ad

## 2019-12-10 ENCOUNTER — APPOINTMENT (OUTPATIENT)
Dept: PHYSICAL THERAPY | Facility: HOSPITAL | Age: 34
End: 2019-12-10
Attending: ADVANCED PRACTICE MIDWIFE
Payer: COMMERCIAL

## 2019-12-12 ENCOUNTER — LABORATORY ENCOUNTER (OUTPATIENT)
Dept: LAB | Facility: HOSPITAL | Age: 34
End: 2019-12-12
Attending: OBSTETRICS & GYNECOLOGY
Payer: COMMERCIAL

## 2019-12-12 ENCOUNTER — TELEPHONE (OUTPATIENT)
Dept: OBGYN CLINIC | Facility: CLINIC | Age: 34
End: 2019-12-12

## 2019-12-12 DIAGNOSIS — R73.09 ELEVATED GLUCOSE TOLERANCE TEST: ICD-10-CM

## 2019-12-12 PROCEDURE — 82952 GTT-ADDED SAMPLES: CPT

## 2019-12-12 PROCEDURE — 36415 COLL VENOUS BLD VENIPUNCTURE: CPT

## 2019-12-12 PROCEDURE — 82951 GLUCOSE TOLERANCE TEST (GTT): CPT

## 2019-12-12 NOTE — TELEPHONE ENCOUNTER
Adina Christie, called and stated that the 3 hr gtt was critical low at 44. Called pt and she stated that she felt nauseated when she did the 3 hr gtt. She states during pregnancy, she can not drink sweet drinks and especially fasting.     Pt's labs were f

## 2019-12-16 ENCOUNTER — TELEPHONE (OUTPATIENT)
Dept: OBGYN CLINIC | Facility: CLINIC | Age: 34
End: 2019-12-16

## 2019-12-16 NOTE — TELEPHONE ENCOUNTER
Kinza Rondon signed on records from [facility]. He signed them on 9-26-19. Sent to scanning.

## 2019-12-18 ENCOUNTER — TELEPHONE (OUTPATIENT)
Dept: OBGYN CLINIC | Facility: CLINIC | Age: 34
End: 2019-12-18

## 2019-12-18 NOTE — TELEPHONE ENCOUNTER
Ahmet Roy From [facility] she needs to confirm a diagnoses code     [phone] option 1

## 2019-12-19 ENCOUNTER — ROUTINE PRENATAL (OUTPATIENT)
Dept: OBGYN CLINIC | Facility: CLINIC | Age: 34
End: 2019-12-19
Payer: COMMERCIAL

## 2019-12-19 VITALS
DIASTOLIC BLOOD PRESSURE: 59 MMHG | SYSTOLIC BLOOD PRESSURE: 99 MMHG | BODY MASS INDEX: 24 KG/M2 | HEART RATE: 82 BPM | WEIGHT: 138 LBS

## 2019-12-19 DIAGNOSIS — Z34.92 ENCOUNTER FOR SUPERVISION OF NORMAL PREGNANCY IN SECOND TRIMESTER, UNSPECIFIED GRAVIDITY: Primary | ICD-10-CM

## 2019-12-19 PROBLEM — O09.299 HISTORY OF POSTPARTUM HEMORRHAGE, CURRENTLY PREGNANT: Status: RESOLVED | Noted: 2019-08-17 | Resolved: 2019-12-19

## 2019-12-19 PROBLEM — Z98.891 HISTORY OF CESAREAN DELIVERY: Status: RESOLVED | Noted: 2019-09-02 | Resolved: 2019-12-19

## 2019-12-19 PROBLEM — O09.00 PREGNANCY WITH HISTORY OF INFERTILITY, ANTEPARTUM: Status: RESOLVED | Noted: 2019-08-17 | Resolved: 2019-12-19

## 2019-12-19 PROBLEM — O09.299 HISTORY OF POSTPARTUM HEMORRHAGE, CURRENTLY PREGNANT (HCC): Status: RESOLVED | Noted: 2019-08-17 | Resolved: 2019-12-19

## 2019-12-19 PROBLEM — O09.00 PREGNANCY WITH HISTORY OF INFERTILITY, ANTEPARTUM (HCC): Status: RESOLVED | Noted: 2019-08-17 | Resolved: 2019-12-19

## 2019-12-19 PROCEDURE — 81002 URINALYSIS NONAUTO W/O SCOPE: CPT | Performed by: OBSTETRICS & GYNECOLOGY

## 2019-12-19 NOTE — PROGRESS NOTES
Needs titers every 2 weeks as per problem list. Signed  consent -- wishes for rCSx at 39 wks if not in labor -- wishes to defer scheduling until next visit. Wishes for TDAP at next visit. Declines flu shot. RTC 2 wks.

## 2019-12-20 ENCOUNTER — TELEPHONE (OUTPATIENT)
Dept: OBGYN CLINIC | Facility: CLINIC | Age: 34
End: 2019-12-20

## 2019-12-23 ENCOUNTER — LAB ENCOUNTER (OUTPATIENT)
Dept: LAB | Facility: HOSPITAL | Age: 34
End: 2019-12-23
Attending: OBSTETRICS & GYNECOLOGY
Payer: COMMERCIAL

## 2019-12-23 DIAGNOSIS — R76.0 ABNORMAL ANTIBODY TITER: ICD-10-CM

## 2019-12-23 PROCEDURE — 86880 COOMBS TEST DIRECT: CPT

## 2019-12-23 PROCEDURE — 36415 COLL VENOUS BLD VENIPUNCTURE: CPT

## 2019-12-23 PROCEDURE — 86077 PHYS BLOOD BANK SERV XMATCH: CPT

## 2019-12-23 PROCEDURE — 86870 RBC ANTIBODY IDENTIFICATION: CPT

## 2019-12-23 PROCEDURE — 86850 RBC ANTIBODY SCREEN: CPT

## 2019-12-23 PROCEDURE — 86886 COOMBS TEST INDIRECT TITER: CPT

## 2019-12-31 ENCOUNTER — ROUTINE PRENATAL (OUTPATIENT)
Dept: OBGYN CLINIC | Facility: CLINIC | Age: 34
End: 2019-12-31
Payer: COMMERCIAL

## 2019-12-31 VITALS
WEIGHT: 141.81 LBS | HEART RATE: 86 BPM | SYSTOLIC BLOOD PRESSURE: 99 MMHG | DIASTOLIC BLOOD PRESSURE: 64 MMHG | BODY MASS INDEX: 24 KG/M2

## 2019-12-31 DIAGNOSIS — Z34.93 ENCOUNTER FOR SUPERVISION OF NORMAL PREGNANCY IN THIRD TRIMESTER, UNSPECIFIED GRAVIDITY: Primary | ICD-10-CM

## 2019-12-31 LAB
APPEARANCE: CLEAR
MULTISTIX LOT#: NORMAL NUMERIC
PH, URINE: 8 (ref 4.5–8)
SPECIFIC GRAVITY: 1 (ref 1–1.03)
URINE-COLOR: YELLOW

## 2019-12-31 PROCEDURE — 81002 URINALYSIS NONAUTO W/O SCOPE: CPT | Performed by: OBSTETRICS & GYNECOLOGY

## 2020-01-05 ENCOUNTER — TELEPHONE (OUTPATIENT)
Dept: OBGYN CLINIC | Facility: CLINIC | Age: 35
End: 2020-01-05

## 2020-01-05 DIAGNOSIS — Z01.818 PRE-OP TESTING: Primary | ICD-10-CM

## 2020-01-05 NOTE — TELEPHONE ENCOUNTER
OB GYN SURGICAL SCHEDULING    Assessment: Previous  section    Pre-Operative Procedure:  Repeat     Side:     In / on:     Date:  19 AM    Admission:  AM Admit    Anesthesia: Spinal    Additional Orders:  Routine Orders    Comments /

## 2020-01-07 ENCOUNTER — HOSPITAL ENCOUNTER (OUTPATIENT)
Dept: PERINATAL CARE | Facility: HOSPITAL | Age: 35
Discharge: HOME | End: 2020-01-07
Attending: OBSTETRICS & GYNECOLOGY
Payer: COMMERCIAL

## 2020-01-07 ENCOUNTER — LAB ENCOUNTER (OUTPATIENT)
Dept: LAB | Facility: HOSPITAL | Age: 35
End: 2020-01-07
Attending: OBSTETRICS & GYNECOLOGY
Payer: COMMERCIAL

## 2020-01-07 VITALS
HEART RATE: 105 BPM | BODY MASS INDEX: 24 KG/M2 | SYSTOLIC BLOOD PRESSURE: 118 MMHG | WEIGHT: 141 LBS | DIASTOLIC BLOOD PRESSURE: 84 MMHG

## 2020-01-07 DIAGNOSIS — R76.0 ELEVATED ANTIBODY LEVELS: ICD-10-CM

## 2020-01-07 DIAGNOSIS — D57.3 SICKLE CELL TRAIT (HCC): ICD-10-CM

## 2020-01-07 DIAGNOSIS — Z86.32 HISTORY OF GESTATIONAL DIABETES: ICD-10-CM

## 2020-01-07 DIAGNOSIS — R76.0 ABNORMAL ANTIBODY TITER: ICD-10-CM

## 2020-01-07 DIAGNOSIS — O09.292 HX OF PREECLAMPSIA, PRIOR PREGNANCY, CURRENTLY PREGNANT, SECOND TRIMESTER: ICD-10-CM

## 2020-01-07 DIAGNOSIS — O09.292 HX OF PREECLAMPSIA, PRIOR PREGNANCY, CURRENTLY PREGNANT, SECOND TRIMESTER: Primary | ICD-10-CM

## 2020-01-07 LAB
ANTIBODY SCREEN: POSITIVE
DIRECT COOMBS POLY: NEGATIVE

## 2020-01-07 PROCEDURE — 99213 OFFICE O/P EST LOW 20 MIN: CPT | Performed by: OBSTETRICS & GYNECOLOGY

## 2020-01-07 PROCEDURE — 86886 COOMBS TEST INDIRECT TITER: CPT

## 2020-01-07 PROCEDURE — 86077 PHYS BLOOD BANK SERV XMATCH: CPT

## 2020-01-07 PROCEDURE — 86850 RBC ANTIBODY SCREEN: CPT

## 2020-01-07 PROCEDURE — 76816 OB US FOLLOW-UP PER FETUS: CPT | Performed by: OBSTETRICS & GYNECOLOGY

## 2020-01-07 PROCEDURE — 86880 COOMBS TEST DIRECT: CPT

## 2020-01-07 PROCEDURE — 36415 COLL VENOUS BLD VENIPUNCTURE: CPT

## 2020-01-07 PROCEDURE — 86870 RBC ANTIBODY IDENTIFICATION: CPT

## 2020-01-07 NOTE — TELEPHONE ENCOUNTER
LMTCB.  IF THERE IS A CHOICE, DOES SHE WANT TO HAVE SURGERY IN AM WITH KCB OR PN WITH MARIA EUGENIA?

## 2020-01-07 NOTE — PROGRESS NOTES
Reason for Consult:   Dear Dr. Sasha Evans,    Thank you for requesting ultrasound evaluation and maternal fetal medicine consultation on [facility]. As you are aware she is a 30year old female with a Chiang pregnancy.   A maternal-fetal medicin with 1st pregnancy   • Dysmenorrhea    • Endometriosis    • Gestational diabetes    • Hyperlipidemia    • Infertility, female     Did a frozen embryo transfer   • Partial rectal prolapse     partial rectal prolapse after vaginal delivery   • Preeclampsia understands that ultrasound cannot rule out all structural and chromosomal abnormalities. IMPRESSION:   1. IUP @  32w3d   2. Scan consistent with dates  3. No fetal structural abnormalities seen  4.  Anti-c  ---Titer 8  5. anti-E antibodies-- nonreact

## 2020-01-08 NOTE — TELEPHONE ENCOUNTER
Pt notified of repeat C-sec scheduled on Monday 2/24/20 with MARIA EUGENIA at 1:30 pm. Advised pt on pre-op info and letter forwarded via [employer] as requested. [employer] message forwarded as reminder to do PRe op labs within 72 hours before surgery.  Encouraged pt to c

## 2020-01-08 NOTE — TELEPHONE ENCOUNTER
KING FROM [facility] CALLED BACK AND CONFIRMED DR. Bradley Brunner WILL ASSIST THIS CASE. [facility] NOTIFIED. CALENDAR AND BOOK UPDATED.

## 2020-01-16 ENCOUNTER — ROUTINE PRENATAL (OUTPATIENT)
Dept: OBGYN CLINIC | Facility: CLINIC | Age: 35
End: 2020-01-16
Payer: COMMERCIAL

## 2020-01-16 VITALS
DIASTOLIC BLOOD PRESSURE: 65 MMHG | BODY MASS INDEX: 24 KG/M2 | WEIGHT: 141.81 LBS | HEART RATE: 93 BPM | SYSTOLIC BLOOD PRESSURE: 103 MMHG

## 2020-01-16 DIAGNOSIS — Z34.83 ENCOUNTER FOR SUPERVISION OF OTHER NORMAL PREGNANCY IN THIRD TRIMESTER: Primary | ICD-10-CM

## 2020-01-16 LAB
MULTISTIX EXPIRATION DATE: NORMAL DATE
MULTISTIX LOT#: NORMAL NUMERIC
PH, URINE: 6 (ref 4.5–8)
SPECIFIC GRAVITY: 1.01 (ref 1–1.03)
UROBILINOGEN,SEMI-QN: 0.2 MG/DL (ref 0–1.9)

## 2020-01-16 PROCEDURE — 81002 URINALYSIS NONAUTO W/O SCOPE: CPT | Performed by: OBSTETRICS & GYNECOLOGY

## 2020-01-21 ENCOUNTER — LAB ENCOUNTER (OUTPATIENT)
Dept: LAB | Facility: HOSPITAL | Age: 35
End: 2020-01-21
Attending: OBSTETRICS & GYNECOLOGY
Payer: COMMERCIAL

## 2020-01-21 ENCOUNTER — HOSPITAL ENCOUNTER (OUTPATIENT)
Dept: PERINATAL CARE | Facility: HOSPITAL | Age: 35
Discharge: HOME | End: 2020-01-21
Attending: OBSTETRICS & GYNECOLOGY
Payer: COMMERCIAL

## 2020-01-21 VITALS — DIASTOLIC BLOOD PRESSURE: 59 MMHG | SYSTOLIC BLOOD PRESSURE: 109 MMHG | HEART RATE: 76 BPM

## 2020-01-21 DIAGNOSIS — Z01.818 PRE-OP TESTING: ICD-10-CM

## 2020-01-21 DIAGNOSIS — R76.0 ABNORMAL ANTIBODY TITER: ICD-10-CM

## 2020-01-21 DIAGNOSIS — R76.0 ELEVATED ANTIBODY LEVELS: Primary | ICD-10-CM

## 2020-01-21 LAB
ANTIBODY SCREEN: POSITIVE
DIRECT COOMBS POLY: NEGATIVE
RH BLOOD TYPE: POSITIVE

## 2020-01-21 PROCEDURE — 86077 PHYS BLOOD BANK SERV XMATCH: CPT

## 2020-01-21 PROCEDURE — 86880 COOMBS TEST DIRECT: CPT

## 2020-01-21 PROCEDURE — 86886 COOMBS TEST INDIRECT TITER: CPT

## 2020-01-21 PROCEDURE — 86850 RBC ANTIBODY SCREEN: CPT

## 2020-01-21 PROCEDURE — 86870 RBC ANTIBODY IDENTIFICATION: CPT

## 2020-01-21 PROCEDURE — 59025 FETAL NON-STRESS TEST: CPT | Performed by: OBSTETRICS & GYNECOLOGY

## 2020-01-21 PROCEDURE — 36415 COLL VENOUS BLD VENIPUNCTURE: CPT

## 2020-01-21 PROCEDURE — 86901 BLOOD TYPING SEROLOGIC RH(D): CPT

## 2020-01-21 PROCEDURE — 86900 BLOOD TYPING SEROLOGIC ABO: CPT

## 2020-01-21 NOTE — NST
Nonstress Test   Patient: Deidre Wright    Gestation: 34w3d    NST: Abnormal antibodies       Variability: Moderate           Accelerations: Yes           Decelerations: None            Baseline: 135 BPM           Uterine Irritability: No           Con

## 2020-01-25 NOTE — ADDENDUM NOTE
Encounter addended by: Jewelene Hatchet, MD on: 1/24/2020 10:15 PM   Actions taken: Clinical Note Signed, Charge Capture section accepted

## 2020-01-28 ENCOUNTER — HOSPITAL ENCOUNTER (OUTPATIENT)
Dept: PERINATAL CARE | Facility: HOSPITAL | Age: 35
Discharge: HOME | End: 2020-01-28
Attending: OBSTETRICS & GYNECOLOGY
Payer: COMMERCIAL

## 2020-01-28 DIAGNOSIS — Z86.32 HISTORY OF GESTATIONAL DIABETES: Primary | ICD-10-CM

## 2020-01-28 PROCEDURE — 59025 FETAL NON-STRESS TEST: CPT | Performed by: OBSTETRICS & GYNECOLOGY

## 2020-01-28 NOTE — NST
Nonstress Test   Patient: Mery Vargas    Gestation: 35w3d    NST: gdm       Variability: Moderate           Accelerations: Yes           Decelerations: None            Baseline: 130 BPM           Uterine Irritability: No           Contractions: Irreg

## 2020-01-30 ENCOUNTER — ROUTINE PRENATAL (OUTPATIENT)
Dept: OBGYN CLINIC | Facility: CLINIC | Age: 35
End: 2020-01-30
Payer: COMMERCIAL

## 2020-01-30 VITALS
DIASTOLIC BLOOD PRESSURE: 70 MMHG | WEIGHT: 142 LBS | BODY MASS INDEX: 24 KG/M2 | SYSTOLIC BLOOD PRESSURE: 110 MMHG | HEART RATE: 66 BPM

## 2020-01-30 DIAGNOSIS — Z34.93 ENCOUNTER FOR SUPERVISION OF NORMAL PREGNANCY IN THIRD TRIMESTER, UNSPECIFIED GRAVIDITY: Primary | ICD-10-CM

## 2020-01-30 LAB
APPEARANCE: CLEAR
MULTISTIX LOT#: NORMAL NUMERIC

## 2020-01-30 PROCEDURE — 90715 TDAP VACCINE 7 YRS/> IM: CPT | Performed by: OBSTETRICS & GYNECOLOGY

## 2020-01-30 PROCEDURE — 90471 IMMUNIZATION ADMIN: CPT | Performed by: OBSTETRICS & GYNECOLOGY

## 2020-01-30 PROCEDURE — 81002 URINALYSIS NONAUTO W/O SCOPE: CPT | Performed by: OBSTETRICS & GYNECOLOGY

## 2020-02-04 ENCOUNTER — LAB ENCOUNTER (OUTPATIENT)
Dept: LAB | Facility: HOSPITAL | Age: 35
End: 2020-02-04
Attending: OBSTETRICS & GYNECOLOGY
Payer: COMMERCIAL

## 2020-02-04 ENCOUNTER — TELEPHONE (OUTPATIENT)
Dept: OBGYN CLINIC | Facility: CLINIC | Age: 35
End: 2020-02-04

## 2020-02-04 ENCOUNTER — HOSPITAL ENCOUNTER (OUTPATIENT)
Dept: PERINATAL CARE | Facility: HOSPITAL | Age: 35
Discharge: HOME | End: 2020-02-04
Attending: OBSTETRICS & GYNECOLOGY
Payer: COMMERCIAL

## 2020-02-04 VITALS — HEART RATE: 68 BPM | DIASTOLIC BLOOD PRESSURE: 62 MMHG | SYSTOLIC BLOOD PRESSURE: 110 MMHG

## 2020-02-04 DIAGNOSIS — Z34.83 ENCOUNTER FOR SUPERVISION OF OTHER NORMAL PREGNANCY IN THIRD TRIMESTER: ICD-10-CM

## 2020-02-04 DIAGNOSIS — R76.0 ELEVATED ANTIBODY LEVELS: Primary | ICD-10-CM

## 2020-02-04 DIAGNOSIS — Z34.82 ENCOUNTER FOR SUPERVISION OF OTHER NORMAL PREGNANCY IN SECOND TRIMESTER: ICD-10-CM

## 2020-02-04 DIAGNOSIS — R76.0 ABNORMAL ANTIBODY TITER: ICD-10-CM

## 2020-02-04 LAB
ANTIBODY SCREEN: POSITIVE
DEPRECATED RDW RBC AUTO: 39.1 FL (ref 35.1–46.3)
DIRECT COOMBS POLY: NEGATIVE
ERYTHROCYTE [DISTWIDTH] IN BLOOD BY AUTOMATED COUNT: 14.4 % (ref 11–15)
HCT VFR BLD AUTO: 34.2 % (ref 35–48)
HGB BLD-MCNC: 11 G/DL (ref 12–16)
MCH RBC QN AUTO: 24.6 PG (ref 26–34)
MCHC RBC AUTO-ENTMCNC: 32.2 G/DL (ref 31–37)
MCV RBC AUTO: 76.3 FL (ref 80–100)
PLATELET # BLD AUTO: 220 10(3)UL (ref 150–450)
RBC # BLD AUTO: 4.48 X10(6)UL (ref 3.8–5.3)
WBC # BLD AUTO: 13.1 X10(3) UL (ref 4–11)

## 2020-02-04 PROCEDURE — 59025 FETAL NON-STRESS TEST: CPT | Performed by: OBSTETRICS & GYNECOLOGY

## 2020-02-04 PROCEDURE — 86886 COOMBS TEST INDIRECT TITER: CPT

## 2020-02-04 PROCEDURE — 86077 PHYS BLOOD BANK SERV XMATCH: CPT

## 2020-02-04 PROCEDURE — 36415 COLL VENOUS BLD VENIPUNCTURE: CPT

## 2020-02-04 PROCEDURE — 86880 COOMBS TEST DIRECT: CPT

## 2020-02-04 PROCEDURE — 85027 COMPLETE CBC AUTOMATED: CPT

## 2020-02-04 PROCEDURE — 86870 RBC ANTIBODY IDENTIFICATION: CPT

## 2020-02-04 PROCEDURE — 87389 HIV-1 AG W/HIV-1&-2 AB AG IA: CPT

## 2020-02-04 PROCEDURE — 86780 TREPONEMA PALLIDUM: CPT

## 2020-02-04 PROCEDURE — 86850 RBC ANTIBODY SCREEN: CPT

## 2020-02-04 NOTE — TELEPHONE ENCOUNTER
Dr Catia Mckinnon received results from pts labs from today in error. Message was routed to [facility] regarding pts antibody screen results.

## 2020-02-04 NOTE — TELEPHONE ENCOUNTER
----- Message from Belem Del Valle MD sent at 2/4/2020  1:42 PM CST -----  I believe this was sent to me in error. Please forward to the proper physician.

## 2020-02-04 NOTE — NST
Nonstress Test   Patient: Jacques Rice    Gestation: 36w3d    NST: Anti C and Anti E antibodies       Variability: Moderate           Accelerations: Yes           Decelerations: None            Baseline: 125 BPM           Uterine Irritability: No

## 2020-02-05 LAB — T PALLIDUM AB SER QL: NEGATIVE

## 2020-02-06 ENCOUNTER — ROUTINE PRENATAL (OUTPATIENT)
Dept: OBGYN CLINIC | Facility: CLINIC | Age: 35
End: 2020-02-06
Payer: COMMERCIAL

## 2020-02-06 VITALS
SYSTOLIC BLOOD PRESSURE: 109 MMHG | WEIGHT: 147 LBS | BODY MASS INDEX: 25 KG/M2 | HEART RATE: 80 BPM | DIASTOLIC BLOOD PRESSURE: 70 MMHG

## 2020-02-06 DIAGNOSIS — Z34.83 ENCOUNTER FOR SUPERVISION OF OTHER NORMAL PREGNANCY IN THIRD TRIMESTER: Primary | ICD-10-CM

## 2020-02-06 LAB
APPEARANCE: CLEAR
MULTISTIX LOT#: NORMAL NUMERIC
PH, URINE: 7.5 (ref 4.5–8)
SPECIFIC GRAVITY: 1.01 (ref 1–1.03)
URINE-COLOR: YELLOW
UROBILINOGEN,SEMI-QN: 0.2 MG/DL (ref 0–1.9)

## 2020-02-06 PROCEDURE — 81002 URINALYSIS NONAUTO W/O SCOPE: CPT | Performed by: OBSTETRICS & GYNECOLOGY

## 2020-02-06 RX ORDER — FERROUS SULFATE 325(65) MG
325 TABLET ORAL
COMMUNITY
Start: 2018-05-14

## 2020-02-11 ENCOUNTER — ROUTINE PRENATAL (OUTPATIENT)
Dept: OBGYN CLINIC | Facility: CLINIC | Age: 35
End: 2020-02-11

## 2020-02-11 ENCOUNTER — HOSPITAL ENCOUNTER (OUTPATIENT)
Dept: PERINATAL CARE | Facility: HOSPITAL | Age: 35
Discharge: HOME | End: 2020-02-11
Attending: OBSTETRICS & GYNECOLOGY
Payer: COMMERCIAL

## 2020-02-11 VITALS
WEIGHT: 147.63 LBS | SYSTOLIC BLOOD PRESSURE: 105 MMHG | BODY MASS INDEX: 25 KG/M2 | HEART RATE: 90 BPM | DIASTOLIC BLOOD PRESSURE: 68 MMHG

## 2020-02-11 DIAGNOSIS — Z34.83 ENCOUNTER FOR SUPERVISION OF OTHER NORMAL PREGNANCY IN THIRD TRIMESTER: Primary | ICD-10-CM

## 2020-02-11 DIAGNOSIS — Z86.32 HISTORY OF GESTATIONAL DIABETES: Primary | ICD-10-CM

## 2020-02-11 LAB
MULTISTIX LOT#: NORMAL NUMERIC
PH, URINE: 5 (ref 4.5–8)
SPECIFIC GRAVITY: 1.02 (ref 1–1.03)
UROBILINOGEN,SEMI-QN: 0 MG/DL (ref 0–1.9)

## 2020-02-11 PROCEDURE — 59025 FETAL NON-STRESS TEST: CPT | Performed by: OBSTETRICS & GYNECOLOGY

## 2020-02-11 PROCEDURE — 81002 URINALYSIS NONAUTO W/O SCOPE: CPT | Performed by: OBSTETRICS & GYNECOLOGY

## 2020-02-11 NOTE — ADDENDUM NOTE
Encounter addended by: Yumi Patricio DO on: 2/11/2020 8:01 AM   Actions taken: Clinical Note Signed, Charge Capture section accepted

## 2020-02-11 NOTE — NST
Nonstress Test   Patient: Sophie Henao    Gestation: 37w3d    NST: gdm       Variability: Moderate           Accelerations: Yes           Decelerations: None            Baseline: 125 BPM           Uterine Irritability: Yes           Contractions: Irre

## 2020-02-13 NOTE — ADDENDUM NOTE
Encounter addended by: Symone Lira DO on: 2/13/2020 5:17 AM   Actions taken: Clinical Note Signed, Charge Capture section accepted

## 2020-02-18 ENCOUNTER — HOSPITAL ENCOUNTER (OUTPATIENT)
Dept: PERINATAL CARE | Facility: HOSPITAL | Age: 35
Discharge: HOME | End: 2020-02-18
Attending: OBSTETRICS & GYNECOLOGY
Payer: COMMERCIAL

## 2020-02-18 DIAGNOSIS — O24.419 GESTATIONAL DIABETES: Primary | ICD-10-CM

## 2020-02-18 PROCEDURE — 59025 FETAL NON-STRESS TEST: CPT | Performed by: OBSTETRICS & GYNECOLOGY

## 2020-02-20 ENCOUNTER — TELEPHONE (OUTPATIENT)
Dept: OBGYN UNIT | Facility: HOSPITAL | Age: 35
End: 2020-02-20

## 2020-02-20 ENCOUNTER — ROUTINE PRENATAL (OUTPATIENT)
Dept: OBGYN CLINIC | Facility: CLINIC | Age: 35
End: 2020-02-20
Payer: COMMERCIAL

## 2020-02-20 VITALS
DIASTOLIC BLOOD PRESSURE: 65 MMHG | BODY MASS INDEX: 26 KG/M2 | SYSTOLIC BLOOD PRESSURE: 104 MMHG | HEART RATE: 83 BPM | WEIGHT: 149 LBS

## 2020-02-20 DIAGNOSIS — Z34.93 ENCOUNTER FOR SUPERVISION OF NORMAL PREGNANCY IN THIRD TRIMESTER, UNSPECIFIED GRAVIDITY: Primary | ICD-10-CM

## 2020-02-20 LAB
APPEARANCE: CLEAR
MULTISTIX LOT#: NORMAL NUMERIC
PH, URINE: 6.5 (ref 4.5–8)
SPECIFIC GRAVITY: 1.01 (ref 1–1.03)
URINE-COLOR: YELLOW

## 2020-02-20 PROCEDURE — 81002 URINALYSIS NONAUTO W/O SCOPE: CPT | Performed by: OBSTETRICS & GYNECOLOGY

## 2020-02-22 ENCOUNTER — LAB ENCOUNTER (OUTPATIENT)
Dept: LAB | Facility: HOSPITAL | Age: 35
End: 2020-02-22
Attending: OBSTETRICS & GYNECOLOGY
Payer: COMMERCIAL

## 2020-02-22 DIAGNOSIS — Z01.818 PRE-OP TESTING: ICD-10-CM

## 2020-02-22 DIAGNOSIS — Z34.82 ENCOUNTER FOR SUPERVISION OF OTHER NORMAL PREGNANCY IN SECOND TRIMESTER: ICD-10-CM

## 2020-02-22 LAB
ANTIBODY SCREEN: POSITIVE
BASOPHILS # BLD AUTO: 0.03 X10(3) UL (ref 0–0.2)
BASOPHILS NFR BLD AUTO: 0.3 %
BILIRUB UR QL: NEGATIVE
COLOR UR: YELLOW
DEPRECATED RDW RBC AUTO: 40.2 FL (ref 35.1–46.3)
DIRECT COOMBS POLY: NEGATIVE
EOSINOPHIL # BLD AUTO: 0.05 X10(3) UL (ref 0–0.7)
EOSINOPHIL NFR BLD AUTO: 0.4 %
ERYTHROCYTE [DISTWIDTH] IN BLOOD BY AUTOMATED COUNT: 15.1 % (ref 11–15)
GLUCOSE UR-MCNC: 50 MG/DL
HCT VFR BLD AUTO: 31.5 % (ref 35–48)
HGB BLD-MCNC: 10.3 G/DL (ref 12–16)
HGB UR QL STRIP.AUTO: NEGATIVE
HYALINE CASTS #/AREA URNS AUTO: 3 /LPF
IMM GRANULOCYTES # BLD AUTO: 0.13 X10(3) UL (ref 0–1)
IMM GRANULOCYTES NFR BLD: 1.1 %
KETONES UR-MCNC: NEGATIVE MG/DL
LYMPHOCYTES # BLD AUTO: 2.02 X10(3) UL (ref 1–4)
LYMPHOCYTES NFR BLD AUTO: 17 %
MCH RBC QN AUTO: 24.3 PG (ref 26–34)
MCHC RBC AUTO-ENTMCNC: 32.7 G/DL (ref 31–37)
MCV RBC AUTO: 74.3 FL (ref 80–100)
MONOCYTES # BLD AUTO: 0.85 X10(3) UL (ref 0.1–1)
MONOCYTES NFR BLD AUTO: 7.1 %
NEUTROPHILS # BLD AUTO: 8.82 X10 (3) UL (ref 1.5–7.7)
NEUTROPHILS # BLD AUTO: 8.82 X10(3) UL (ref 1.5–7.7)
NEUTROPHILS NFR BLD AUTO: 74.1 %
NITRITE UR QL STRIP.AUTO: NEGATIVE
PH UR: 6 [PH] (ref 5–8)
PLATELET # BLD AUTO: 265 10(3)UL (ref 150–450)
PROT UR-MCNC: NEGATIVE MG/DL
RBC # BLD AUTO: 4.24 X10(6)UL (ref 3.8–5.3)
RBC #/AREA URNS AUTO: 4 /HPF
SP GR UR STRIP: 1.01 (ref 1–1.03)
UROBILINOGEN UR STRIP-ACNC: <2
WBC # BLD AUTO: 11.9 X10(3) UL (ref 4–11)
WBC #/AREA URNS AUTO: 10 /HPF

## 2020-02-22 PROCEDURE — 86077 PHYS BLOOD BANK SERV XMATCH: CPT

## 2020-02-22 PROCEDURE — 86870 RBC ANTIBODY IDENTIFICATION: CPT

## 2020-02-22 PROCEDURE — 86900 BLOOD TYPING SEROLOGIC ABO: CPT

## 2020-02-22 PROCEDURE — 86886 COOMBS TEST INDIRECT TITER: CPT

## 2020-02-22 PROCEDURE — 36415 COLL VENOUS BLD VENIPUNCTURE: CPT

## 2020-02-22 PROCEDURE — 86850 RBC ANTIBODY SCREEN: CPT

## 2020-02-22 PROCEDURE — 87086 URINE CULTURE/COLONY COUNT: CPT

## 2020-02-22 PROCEDURE — 86880 COOMBS TEST DIRECT: CPT

## 2020-02-22 PROCEDURE — 85025 COMPLETE CBC W/AUTO DIFF WBC: CPT

## 2020-02-22 PROCEDURE — 81001 URINALYSIS AUTO W/SCOPE: CPT

## 2020-02-22 PROCEDURE — 86901 BLOOD TYPING SEROLOGIC RH(D): CPT

## 2020-02-23 ENCOUNTER — ANESTHESIA (OUTPATIENT)
Dept: OBGYN UNIT | Facility: HOSPITAL | Age: 35
End: 2020-02-23
Payer: COMMERCIAL

## 2020-02-23 ENCOUNTER — HOSPITAL ENCOUNTER (INPATIENT)
Facility: HOSPITAL | Age: 35
LOS: 2 days | Discharge: HOME | End: 2020-02-25
Attending: OBSTETRICS & GYNECOLOGY | Admitting: OBSTETRICS & GYNECOLOGY
Payer: COMMERCIAL

## 2020-02-23 ENCOUNTER — ANESTHESIA EVENT (OUTPATIENT)
Dept: OBGYN UNIT | Facility: HOSPITAL | Age: 35
End: 2020-02-23
Payer: COMMERCIAL

## 2020-02-23 LAB — RH BLOOD TYPE: POSITIVE

## 2020-02-23 PROCEDURE — 0KQM0ZZ REPAIR PERINEUM MUSCLE, OPEN APPROACH: ICD-10-PCS | Performed by: OBSTETRICS & GYNECOLOGY

## 2020-02-23 RX ORDER — EPHEDRINE SULFATE/0.9% NACL/PF 25 MG/5 ML
5 SYRINGE (ML) INTRAVENOUS AS NEEDED
Status: DISCONTINUED | OUTPATIENT
Start: 2020-02-23 | End: 2020-02-23

## 2020-02-23 RX ORDER — EPHEDRINE SULFATE/0.9% NACL/PF 25 MG/5 ML
SYRINGE (ML) INTRAVENOUS
Status: COMPLETED
Start: 2020-02-23 | End: 2020-02-23

## 2020-02-23 RX ORDER — LIDOCAINE HYDROCHLORIDE AND EPINEPHRINE 20; 5 MG/ML; UG/ML
20 INJECTION, SOLUTION EPIDURAL; INFILTRATION; INTRACAUDAL; PERINEURAL ONCE
Status: DISCONTINUED | OUTPATIENT
Start: 2020-02-23 | End: 2020-02-23

## 2020-02-23 RX ORDER — SODIUM CHLORIDE 0.9 % (FLUSH) 0.9 %
10 SYRINGE (ML) INJECTION AS NEEDED
Status: DISCONTINUED | OUTPATIENT
Start: 2020-02-23 | End: 2020-02-25

## 2020-02-23 RX ORDER — ONDANSETRON 2 MG/ML
4 INJECTION INTRAMUSCULAR; INTRAVENOUS EVERY 6 HOURS PRN
Status: DISCONTINUED | OUTPATIENT
Start: 2020-02-23 | End: 2020-02-25

## 2020-02-23 RX ORDER — MISOPROSTOL 200 UG/1
800 TABLET ORAL ONCE
Status: COMPLETED | OUTPATIENT
Start: 2020-02-23 | End: 2020-02-23

## 2020-02-23 RX ORDER — IBUPROFEN 600 MG/1
600 TABLET ORAL EVERY 6 HOURS
Status: DISCONTINUED | OUTPATIENT
Start: 2020-02-23 | End: 2020-02-25

## 2020-02-23 RX ORDER — DIAPER,BRIEF,INFANT-TODD,DISP
1 EACH MISCELLANEOUS EVERY 6 HOURS PRN
Status: DISCONTINUED | OUTPATIENT
Start: 2020-02-23 | End: 2020-02-25

## 2020-02-23 RX ORDER — DOCUSATE SODIUM 100 MG/1
100 CAPSULE, LIQUID FILLED ORAL 2 TIMES DAILY
Status: DISCONTINUED | OUTPATIENT
Start: 2020-02-23 | End: 2020-02-25

## 2020-02-23 RX ORDER — BISACODYL 10 MG
10 SUPPOSITORY, RECTAL RECTAL ONCE AS NEEDED
Status: DISCONTINUED | OUTPATIENT
Start: 2020-02-23 | End: 2020-02-25

## 2020-02-23 RX ORDER — FAMOTIDINE 10 MG/ML
20 INJECTION, SOLUTION INTRAVENOUS ONCE
Status: COMPLETED | OUTPATIENT
Start: 2020-02-23 | End: 2020-02-23

## 2020-02-23 RX ORDER — BUPIVACAINE HYDROCHLORIDE 2.5 MG/ML
INJECTION, SOLUTION EPIDURAL; INFILTRATION; INTRACAUDAL AS NEEDED
Status: DISCONTINUED | OUTPATIENT
Start: 2020-02-23 | End: 2020-02-23 | Stop reason: SURG

## 2020-02-23 RX ORDER — SODIUM CHLORIDE, SODIUM LACTATE, POTASSIUM CHLORIDE, CALCIUM CHLORIDE 600; 310; 30; 20 MG/100ML; MG/100ML; MG/100ML; MG/100ML
INJECTION, SOLUTION INTRAVENOUS CONTINUOUS
Status: DISCONTINUED | OUTPATIENT
Start: 2020-02-23 | End: 2020-02-23

## 2020-02-23 RX ORDER — METOCLOPRAMIDE 10 MG/1
10 TABLET ORAL ONCE
Status: COMPLETED | OUTPATIENT
Start: 2020-02-23 | End: 2020-02-23

## 2020-02-23 RX ORDER — TRANEXAMIC ACID 10 MG/ML
INJECTION, SOLUTION INTRAVENOUS
Status: DISPENSED
Start: 2020-02-23 | End: 2020-02-23

## 2020-02-23 RX ORDER — AMMONIA INHALANTS 0.04 G/.3ML
0.3 INHALANT RESPIRATORY (INHALATION) AS NEEDED
Status: DISCONTINUED | OUTPATIENT
Start: 2020-02-23 | End: 2020-02-25

## 2020-02-23 RX ORDER — SIMETHICONE 80 MG
80 TABLET,CHEWABLE ORAL 3 TIMES DAILY PRN
Status: DISCONTINUED | OUTPATIENT
Start: 2020-02-23 | End: 2020-02-25

## 2020-02-23 RX ORDER — BUPIVACAINE HYDROCHLORIDE 2.5 MG/ML
30 INJECTION, SOLUTION EPIDURAL; INFILTRATION; INTRACAUDAL ONCE
Status: DISCONTINUED | OUTPATIENT
Start: 2020-02-23 | End: 2020-02-23

## 2020-02-23 RX ORDER — ONDANSETRON 2 MG/ML
4 INJECTION INTRAMUSCULAR; INTRAVENOUS EVERY 6 HOURS PRN
Status: DISCONTINUED | OUTPATIENT
Start: 2020-02-23 | End: 2020-02-23

## 2020-02-23 RX ORDER — CARBOPROST TROMETHAMINE 250 UG/ML
INJECTION, SOLUTION INTRAMUSCULAR
Status: DISPENSED
Start: 2020-02-23 | End: 2020-02-23

## 2020-02-23 RX ORDER — METHYLERGONOVINE MALEATE 0.2 MG/ML
INJECTION INTRAVENOUS
Status: DISPENSED
Start: 2020-02-23 | End: 2020-02-23

## 2020-02-23 RX ORDER — FAMOTIDINE 20 MG/1
20 TABLET ORAL ONCE
Status: COMPLETED | OUTPATIENT
Start: 2020-02-23 | End: 2020-02-23

## 2020-02-23 RX ORDER — TRISODIUM CITRATE DIHYDRATE AND CITRIC ACID MONOHYDRATE 500; 334 MG/5ML; MG/5ML
30 SOLUTION ORAL ONCE
Status: COMPLETED | OUTPATIENT
Start: 2020-02-23 | End: 2020-02-23

## 2020-02-23 RX ORDER — CHOLECALCIFEROL (VITAMIN D3) 25 MCG
1 TABLET,CHEWABLE ORAL DAILY
Status: DISCONTINUED | OUTPATIENT
Start: 2020-02-23 | End: 2020-02-25

## 2020-02-23 RX ORDER — MISOPROSTOL 200 UG/1
TABLET ORAL
Status: DISPENSED
Start: 2020-02-23 | End: 2020-02-23

## 2020-02-23 RX ORDER — DIPHENHYDRAMINE HYDROCHLORIDE 50 MG/ML
12.5 INJECTION INTRAMUSCULAR; INTRAVENOUS EVERY 4 HOURS PRN
Status: DISCONTINUED | OUTPATIENT
Start: 2020-02-23 | End: 2020-02-23

## 2020-02-23 RX ORDER — LIDOCAINE HYDROCHLORIDE AND EPINEPHRINE 15; 5 MG/ML; UG/ML
INJECTION, SOLUTION EPIDURAL AS NEEDED
Status: DISCONTINUED | OUTPATIENT
Start: 2020-02-23 | End: 2020-02-23 | Stop reason: SURG

## 2020-02-23 RX ORDER — LIDOCAINE HYDROCHLORIDE 10 MG/ML
INJECTION, SOLUTION EPIDURAL; INFILTRATION; INTRACAUDAL; PERINEURAL
Status: COMPLETED
Start: 2020-02-23 | End: 2020-02-23

## 2020-02-23 RX ORDER — METOCLOPRAMIDE HYDROCHLORIDE 5 MG/ML
10 INJECTION INTRAMUSCULAR; INTRAVENOUS ONCE
Status: COMPLETED | OUTPATIENT
Start: 2020-02-23 | End: 2020-02-23

## 2020-02-23 RX ORDER — SODIUM CHLORIDE 0.9 % (FLUSH) 0.9 %
10 SYRINGE (ML) INJECTION AS NEEDED
Status: DISCONTINUED | OUTPATIENT
Start: 2020-02-23 | End: 2020-02-23

## 2020-02-23 RX ORDER — BUPIVACAINE HYDROCHLORIDE 2.5 MG/ML
INJECTION, SOLUTION EPIDURAL; INFILTRATION; INTRACAUDAL
Status: DISPENSED
Start: 2020-02-23 | End: 2020-02-24

## 2020-02-23 RX ORDER — LIDOCAINE HYDROCHLORIDE 10 MG/ML
INJECTION, SOLUTION EPIDURAL; INFILTRATION; INTRACAUDAL; PERINEURAL AS NEEDED
Status: DISCONTINUED | OUTPATIENT
Start: 2020-02-23 | End: 2020-02-23 | Stop reason: SURG

## 2020-02-23 RX ORDER — CEFAZOLIN SODIUM/WATER 2 G/20 ML
2 SYRINGE (ML) INTRAVENOUS ONCE
Status: DISCONTINUED | OUTPATIENT
Start: 2020-02-23 | End: 2020-02-23

## 2020-02-23 RX ORDER — TRISODIUM CITRATE DIHYDRATE AND CITRIC ACID MONOHYDRATE 500; 334 MG/5ML; MG/5ML
30 SOLUTION ORAL ONCE
Status: DISCONTINUED | OUTPATIENT
Start: 2020-02-23 | End: 2020-02-23

## 2020-02-23 RX ORDER — ACETAMINOPHEN 325 MG/1
650 TABLET ORAL EVERY 6 HOURS PRN
Status: DISCONTINUED | OUTPATIENT
Start: 2020-02-23 | End: 2020-02-25

## 2020-02-23 RX ADMIN — BUPIVACAINE HYDROCHLORIDE 2 MG: 2.5 INJECTION, SOLUTION EPIDURAL; INFILTRATION; INTRACAUDAL at 12:25:00

## 2020-02-23 RX ADMIN — LIDOCAINE HYDROCHLORIDE AND EPINEPHRINE 3 ML: 15; 5 INJECTION, SOLUTION EPIDURAL at 12:26:00

## 2020-02-23 RX ADMIN — LIDOCAINE HYDROCHLORIDE 3 ML: 10 INJECTION, SOLUTION EPIDURAL; INFILTRATION; INTRACAUDAL; PERINEURAL at 12:21:00

## 2020-02-23 NOTE — L&D DELIVERY NOTE
[facility]    Vaginal Delivery Note    Skipper Cowboy Patient Status:  Inpatient    1985 MRN [id number]   Location [address]  Attending Mar Bustamante MD   Hosp Day # 0 PCP Stanley Vega MD     Delivery

## 2020-02-23 NOTE — ANESTHESIA PROCEDURE NOTES
Labor Analgesia  Performed by: Sharonda Jacobsen DO  Authorized by: Sharonda Jacobsen DO       General Information and Staff    Start Time:   Anesthesiologist: Sharonda Jacobsen DO  Performed by:   Anesthesiologist  Patient Location:  OB  Reason for Block: labor epi

## 2020-02-23 NOTE — ANESTHESIA PREPROCEDURE EVALUATION
Anesthesia PreOp Note    HPI:     Lowell Stevens is a 30year old female who presents for preoperative consultation requested by: Belinda Duron DO    Date of Surgery: 2020    Procedure(s):   SECTION  Indication: Repeat cesarian section. Taking      Normal Saline Flush 0.9 % injection 10 mL, 10 mL, Intravenous, PRN, Farhana Diego MD  lactated ringers infusion, , Intravenous, Continuous, Farhana Diego MD  lactated ringers IV bolus 1,000 mL, 1,000 mL, Intravenous, Once, Lurlene Null phone: Not on file        Gets together: Not on file        Attends Buddhist service: Not on file        Active member of club or organization: Not on file        Attends meetings of clubs or organizations: Not on file        Relationship status: Not on f informed Adron Dl of the risks of spinal anesthesia including, but not limited to: failure, headache, backache, difficulty breathing, infection, bleeding, nerve damage, paralysis, death. The patient desires the proposed anesthetic as planned.  Dura

## 2020-02-23 NOTE — ANESTHESIA PREPROCEDURE EVALUATION
Anesthesia PreOp Note    HPI:     Aurelio Hamlin is a 30year old female who presents for preoperative consultation requested by: * No surgeons listed *    Date of Surgery: 2/23/2020    * No procedures listed *  Indication: * No pre-op diagnosis entered Taking      Normal Saline Flush 0.9 % injection 10 mL, 10 mL, Intravenous, PRN, Dano Diego MD  lactated ringers infusion, , Intravenous, Continuous, Dano Diego MD  lactated ringers IV bolus 1,000 mL, 1,000 mL, Intravenous, Once, [facility] Allergies    History reviewed. No pertinent family history.   Social History    Socioeconomic History      Marital status:       Spouse name: Shaista Jane      Number of children: 3      Years of education: 18      Highest education level: Not on f reviewed.   Lab Results   Component Value Date    WBC 11.9 (H) 02/22/2020    RBC 4.24 02/22/2020    HGB 10.3 (L) 02/22/2020    HCT 31.5 (L) 02/22/2020    MCV 74.3 (L) 02/22/2020    MCH 24.3 (L) 02/22/2020    MCHC 32.7 02/22/2020    RDW 15.1 (H) 02/22/2020

## 2020-02-23 NOTE — PROGRESS NOTES
Patient transferred to [facility] office for [address] via wheelchair escorted by PCT

## 2020-02-23 NOTE — H&P
[address]  Patient Status:  Observation    1985 MRN [id number]   Location [facility] Attending Juan Carlos Cabezas MD   Hosp Day # 0 PCP Minal Freeman MD     Date of Ad (2.58 kg) F Caesarean  Y HAYLIE      Complications: Preeclampsia     Past Medical History:   Past Medical History:   Diagnosis Date   • Abnormal antibody titer     abnormal antibody in pregnancy   • Abnormal uterine bleeding     heavy bleeding due to endometr %    Station: -2    Position: Cephalic     Musculoskeletal:  No calf tenderness    FHT assessment:   Baseline: 120's-130's bpm   Variability: moderate   Accels:  present   Decels: No   Tocos:  Yes, irreg q 5 minutes   Category: 1 tracing    Results:     Re

## 2020-02-23 NOTE — PROGRESS NOTES
Patient up to bathroom with assist x 2. Voided 500 ml. Patient transferred to mother/baby room 360 per wheelchair in stable condition with baby and personal belongings. Accompanied by significant other and staff.   Report given to Jeimy mother/bab Additional Safety/Bands:

## 2020-02-23 NOTE — DISCHARGE SUMMARY
[facility]    Discharge Summary    Chip Atkinson Patient Status:  Inpatient    1985 MRN [id number]   Location [address]  Attending Pedro Mcfarland MD   [facility] Day # 2       Delivering OB Clinician:

## 2020-02-23 NOTE — ANESTHESIA POSTPROCEDURE EVALUATION
Patient: Nemesio Garcia    Procedure Summary     Date:  02/23/20 Room / Location:      Anesthesia Start:  1218 Anesthesia Stop:  7631    Procedure:  LABOR ANALGESIA Diagnosis:      Scheduled Providers:   Anesthesiologist:  DO Obdulia Hitchcock

## 2020-02-23 NOTE — PROGRESS NOTES
2020, 1:16 PM    Subjective:  Patient is comfortable with an epidural. Reviewed risks and benefits of  vs repeat CS. At this time patient would like to proceed with trial of labor. AROM with clear fluid.     Objective:   20  1245 20  12

## 2020-02-23 NOTE — PROGRESS NOTES
Pt is a 30year old female admitted to TR3/TR3-A. Patient presents with:  R/o Labor: cxns since 0500; pt states contractions getting stronger and more frequent; pt states cxns q5m for last 2 hours      Pt is [id number] 39w1d intra-uterine pregnancy.   Histor

## 2020-02-24 ENCOUNTER — TELEPHONE (OUTPATIENT)
Dept: OBGYN CLINIC | Facility: CLINIC | Age: 35
End: 2020-02-24

## 2020-02-24 LAB
BASOPHILS # BLD AUTO: 0.02 X10(3) UL (ref 0–0.2)
BASOPHILS NFR BLD AUTO: 0.1 %
DEPRECATED RDW RBC AUTO: 40.6 FL (ref 35.1–46.3)
EOSINOPHIL # BLD AUTO: 0.1 X10(3) UL (ref 0–0.7)
EOSINOPHIL NFR BLD AUTO: 0.7 %
ERYTHROCYTE [DISTWIDTH] IN BLOOD BY AUTOMATED COUNT: 15.4 % (ref 11–15)
HCT VFR BLD AUTO: 27.3 % (ref 35–48)
HGB BLD-MCNC: 9.1 G/DL (ref 12–16)
IMM GRANULOCYTES # BLD AUTO: 0.1 X10(3) UL (ref 0–1)
IMM GRANULOCYTES NFR BLD: 0.7 %
LYMPHOCYTES # BLD AUTO: 2.05 X10(3) UL (ref 1–4)
LYMPHOCYTES NFR BLD AUTO: 15.3 %
MCH RBC QN AUTO: 24.7 PG (ref 26–34)
MCHC RBC AUTO-ENTMCNC: 33.3 G/DL (ref 31–37)
MCV RBC AUTO: 74 FL (ref 80–100)
MONOCYTES # BLD AUTO: 1.04 X10(3) UL (ref 0.1–1)
MONOCYTES NFR BLD AUTO: 7.8 %
NEUTROPHILS # BLD AUTO: 10.06 X10 (3) UL (ref 1.5–7.7)
NEUTROPHILS # BLD AUTO: 10.06 X10(3) UL (ref 1.5–7.7)
NEUTROPHILS NFR BLD AUTO: 75.4 %
PLATELET # BLD AUTO: 232 10(3)UL (ref 150–450)
RBC # BLD AUTO: 3.69 X10(6)UL (ref 3.8–5.3)
WBC # BLD AUTO: 13.4 X10(3) UL (ref 4–11)

## 2020-02-24 NOTE — PROGRESS NOTES
Post-Partum Note   2/24/2020, 7:00 AM    Subjective:  Patient doing well. Pain is well controlled. She is ambulating without lightheadedness or dizziness. Denies fevers or chills. Denies SOB, CP. She denies heavy bleeding. She does have some cramping.  She

## 2020-02-24 NOTE — PLAN OF CARE
Problem: POSTPARTUM  Goal: Long Term Goal:Experiences normal postpartum course  Description  INTERVENTIONS:  - Assess and monitor vital signs and lab values. - Assess fundus and lochia. - Provide ice/sitz baths for perineum discomfort.   - Monitor heali pain/trauma. - Instruct and provide assistance with proper latch. - Review techniques for milk expression (breast pumping) and storage of breast milk. Provide pumping equipment/supplies, instructions and assistance, as needed.   - Encourage rooming-in and opioid side effects  - Notify MD/LIP if interventions unsuccessful or patient reports new pain  - Anticipate increased pain with activity and pre-medicate as appropriate  Outcome: Progressing     Problem: Patient/Family Goals  Goal: Patient/Family Long Ter

## 2020-02-24 NOTE — PLAN OF CARE
Problem: POSTPARTUM  Goal: Long Term Goal:Experiences normal postpartum course  Description  INTERVENTIONS:  - Assess and monitor vital signs and lab values. - Assess fundus and lochia. - Provide ice/sitz baths for perineum discomfort.   - Monitor heali pain/trauma. - Instruct and provide assistance with proper latch. - Review techniques for milk expression (breast pumping) and storage of breast milk. Provide pumping equipment/supplies, instructions and assistance, as needed.   - Encourage rooming-in and opioid side effects  - Notify MD/LIP if interventions unsuccessful or patient reports new pain  - Anticipate increased pain with activity and pre-medicate as appropriate  Outcome: Progressing

## 2020-02-24 NOTE — LACTATION NOTE
This note was copied from a baby's chart.   LACTATION NOTE - INFANT    Evaluation Type  Evaluation Type: Inpatient    Problems & Assessment  Problems: comment/detail: Missy +  Infant Assessment: Hunger cues present;Skin color: pink or appropriate for [facility]

## 2020-02-24 NOTE — LACTATION NOTE
LACTATION NOTE - MOTHER      Evaluation Type: Inpatient    Problems identified  Problems identified: Knowledge deficit    Maternal history  Maternal history: AMA  Other/comment: GBS+    Breastfeeding goal  Breastfeeding goal: To maintain breast milk feedin

## 2020-02-25 VITALS
OXYGEN SATURATION: 100 % | DIASTOLIC BLOOD PRESSURE: 59 MMHG | SYSTOLIC BLOOD PRESSURE: 112 MMHG | RESPIRATION RATE: 18 BRPM | TEMPERATURE: 98 F | HEART RATE: 81 BPM

## 2020-02-25 RX ORDER — PSEUDOEPHEDRINE HCL 30 MG
100 TABLET ORAL 2 TIMES DAILY
Qty: 30 CAPSULE | Refills: 0 | Status: SHIPPED | OUTPATIENT
Start: 2020-02-25

## 2020-02-25 RX ORDER — IBUPROFEN 600 MG/1
600 TABLET ORAL EVERY 6 HOURS
Qty: 20 TABLET | Refills: 0 | Status: SHIPPED | OUTPATIENT
Start: 2020-02-25

## 2020-02-25 NOTE — TELEPHONE ENCOUNTER
Pt delivered on 2/23/20.    ----- Message from Wayne Crockett MD sent at 2/22/2020  4:48 PM CST -----  Anti (little) c identified. If patient needs transfusion with this pregnancy then the blood will need to be screened for Anti-(little) c.   Bharti onofre

## 2020-02-25 NOTE — NST
Nonstress Test   Patient: Nemesio Garcia    Gestation: 39w1d    NST:        Variability: Moderate           Accelerations: Yes           Decelerations: None            Baseline: 120 BPM           Uterine Irritability: No           Contractions: Gabe Meyer

## 2020-02-25 NOTE — LACTATION NOTE
This note was copied from a baby's chart.   LACTATION NOTE - INFANT    Evaluation Type  Evaluation Type: Inpatient    Problems & Assessment  Infant Assessment: Skin color: pink or appropriate for ethnicity;Skin color: jaundice  Muscle tone: Appropriate for

## 2020-02-25 NOTE — PLAN OF CARE
Problem: POSTPARTUM  Goal: Long Term Goal:Experiences normal postpartum course  Description  INTERVENTIONS:  - Assess and monitor vital signs and lab values. - Assess fundus and lochia. - Provide ice/sitz baths for perineum discomfort.   - Monitor heali pain/trauma. - Instruct and provide assistance with proper latch. - Review techniques for milk expression (breast pumping) and storage of breast milk. Provide pumping equipment/supplies, instructions and assistance, as needed.   - Encourage rooming-in and effects  - Notify MD/LIP if interventions unsuccessful or patient reports new pain  - Anticipate increased pain with activity and pre-medicate as appropriate  Outcome: Completed     Problem: Patient/Family Goals  Goal: Patient/Family Long Term Goal  Descri

## 2020-02-25 NOTE — LACTATION NOTE
LACTATION NOTE - MOTHER      Evaluation Type: Inpatient    Maternal history  Maternal history: Gestational diabetes; Infertility  Other/comment: Missy +; endometriosis; h/o preeclampsia     Breastfeeding goal  Breastfeeding goal: To maintain breast milk fe

## 2020-02-26 LAB
BLOOD TYPE BARCODE: 5100
BLOOD TYPE BARCODE: 9500

## 2020-02-27 ENCOUNTER — TELEPHONE (OUTPATIENT)
Dept: OBGYN UNIT | Facility: HOSPITAL | Age: 35
End: 2020-02-27

## 2020-02-27 NOTE — ADDENDUM NOTE
Encounter addended by: Hilary Chester MD on: 2/27/2020 5:33 PM   Actions taken: Clinical Note Signed, Charge Capture section accepted

## 2021-02-08 ENCOUNTER — HOSPITAL ENCOUNTER (OUTPATIENT)
Age: 36
Discharge: HOME | End: 2021-02-08
Payer: MEDICAID

## 2021-02-08 VITALS
SYSTOLIC BLOOD PRESSURE: 128 MMHG | TEMPERATURE: 98 F | DIASTOLIC BLOOD PRESSURE: 71 MMHG | OXYGEN SATURATION: 100 % | RESPIRATION RATE: 16 BRPM | HEART RATE: 97 BPM

## 2021-02-08 DIAGNOSIS — Z20.822 LAB TEST NEGATIVE FOR COVID-19 VIRUS: ICD-10-CM

## 2021-02-08 DIAGNOSIS — Z20.822 ENCOUNTER FOR SCREENING LABORATORY TESTING FOR COVID-19 VIRUS: Primary | ICD-10-CM

## 2021-02-08 LAB — SARS-COV-2 RNA RESP QL NAA+PROBE: NOT DETECTED

## 2021-02-08 PROCEDURE — U0002 COVID-19 LAB TEST NON-CDC: HCPCS | Performed by: NURSE PRACTITIONER

## 2021-02-08 PROCEDURE — 99202 OFFICE O/P NEW SF 15 MIN: CPT | Performed by: NURSE PRACTITIONER

## 2021-02-08 NOTE — ED INITIAL ASSESSMENT (HPI)
States  tested positive for covid 1/28. No symptoms. No fever. No cold/cough. Awake/alert. Breathing easy and even without distress. Speech clear. Skin warm, dry and pink.

## 2021-02-08 NOTE — ED PROVIDER NOTES
Patient presents with:  Testing      HPI:     Lavinia Mock is a 37year old female who presents for a Covid test.  She denies any symptoms or complaints. Her  recently tested positive. She appears well and nontoxic.   [facility]  [address] [facility] Hazards: Not Asked        Sleep Concern: Not Asked        Stress Concern: Not Asked        Weight Concern: Not Asked        Special Diet: Yes          vegetarian        Back Care: Not Asked        Exercise: No        Bike Helmet: Not Asked        Seat Belt virus  R52.647        All results reviewed and discussed with patient. See AVS for detailed discharge instructions for your condition today.     Follow Up with:  Genesis Mcginnis MD  [facility] [place], [place] [address]Ronald Best [facility]  [phone]    Schedule an bonifacio

## 2023-05-22 ENCOUNTER — HOSPITAL ENCOUNTER (OUTPATIENT)
Age: 38
Discharge: HOME | End: 2023-05-22
Payer: MEDICAID

## 2023-05-22 VITALS
DIASTOLIC BLOOD PRESSURE: 86 MMHG | OXYGEN SATURATION: 100 % | SYSTOLIC BLOOD PRESSURE: 125 MMHG | TEMPERATURE: 99 F | HEART RATE: 94 BPM | RESPIRATION RATE: 16 BRPM

## 2023-05-22 DIAGNOSIS — J01.00 ACUTE NON-RECURRENT MAXILLARY SINUSITIS: Primary | ICD-10-CM

## 2023-05-22 PROCEDURE — 99213 OFFICE O/P EST LOW 20 MIN: CPT | Performed by: NURSE PRACTITIONER

## 2023-05-22 RX ORDER — GUAIFENESIN AND CODEINE PHOSPHATE 100; 10 MG/5ML; MG/5ML
5 SOLUTION ORAL EVERY 6 HOURS PRN
Qty: 420 ML | Refills: 0 | Status: SHIPPED | OUTPATIENT
Start: 2023-05-22 | End: 2023-06-05

## 2023-05-22 RX ORDER — BENZONATATE 100 MG/1
100 CAPSULE ORAL 3 TIMES DAILY PRN
Qty: 21 CAPSULE | Refills: 0 | Status: SHIPPED | OUTPATIENT
Start: 2023-05-22 | End: 2023-05-29

## 2023-05-22 RX ORDER — AMOXICILLIN AND CLAVULANATE POTASSIUM 875; 125 MG/1; MG/1
1 TABLET, FILM COATED ORAL 2 TIMES DAILY
Qty: 14 TABLET | Refills: 0 | Status: SHIPPED | OUTPATIENT
Start: 2023-05-22 | End: 2023-05-29

## 2023-05-22 NOTE — DISCHARGE INSTRUCTIONS
Please take antibiotics as prescribed. Twice a day for 7 days. Tessalon Perles as needed for cough suppressant during the daytime. Cough medicine with codeine at nighttime. Do not take NyQuil if you are taking cough medicine at nighttime. No drinking, driving, working with cough medicine. As discussed, please drink plenty water and stay well-hydrated. Sleep somewhat elevated and upright to help avoid postnasal drip. Sleep with humidifier. Steam showers for cough and congestion. If you have any new or worsening symptoms, such as dizziness, lightheadedness, palpitations, chest pain, shortness of breath, please go to emergency room.

## 2023-09-11 ENCOUNTER — LAB ENCOUNTER (OUTPATIENT)
Dept: LAB | Age: 38
End: 2023-09-11
Attending: STUDENT IN AN ORGANIZED HEALTH CARE EDUCATION/TRAINING PROGRAM
Payer: MEDICAID

## 2023-09-11 ENCOUNTER — OFFICE VISIT (OUTPATIENT)
Dept: PODIATRY CLINIC | Facility: CLINIC | Age: 38
End: 2023-09-11

## 2023-09-11 DIAGNOSIS — B35.1 ONYCHOMYCOSIS: Primary | ICD-10-CM

## 2023-09-11 DIAGNOSIS — B35.1 ONYCHOMYCOSIS: ICD-10-CM

## 2023-09-11 LAB
ALBUMIN SERPL-MCNC: 4.2 G/DL (ref 3.4–5)
ALP LIVER SERPL-CCNC: 37 U/L
ALT SERPL-CCNC: 23 U/L
AST SERPL-CCNC: 17 U/L (ref 15–37)
BILIRUB DIRECT SERPL-MCNC: 0.2 MG/DL (ref 0–0.2)
BILIRUB SERPL-MCNC: 0.7 MG/DL (ref 0.1–2)
PROT SERPL-MCNC: 7.2 G/DL (ref 6.4–8.2)

## 2023-09-11 PROCEDURE — 80076 HEPATIC FUNCTION PANEL: CPT

## 2023-09-11 PROCEDURE — 99203 OFFICE O/P NEW LOW 30 MIN: CPT | Performed by: STUDENT IN AN ORGANIZED HEALTH CARE EDUCATION/TRAINING PROGRAM

## 2023-09-11 PROCEDURE — 36415 COLL VENOUS BLD VENIPUNCTURE: CPT

## 2023-09-11 RX ORDER — TERBINAFINE HYDROCHLORIDE 250 MG/1
250 TABLET ORAL DAILY
Qty: 90 TABLET | Refills: 0 | Status: SHIPPED | OUTPATIENT
Start: 2023-09-11 | End: 2023-12-10

## 2023-09-11 NOTE — PROGRESS NOTES
[address] [facility]  Progress Note      Caity Escobar is a 41year old female. Patient presents with: Toe Pain: New pt- R foot toes - nail thickening and discoloration- stated the R hallux toenail fell off 2 weeks ago- rates pain 3-8/10 on and off            HPI:     Patient is a pleasant 30-year-old female who presents to clinic for evaluation of toenails 1 through 5 to the right foot. She admits to using over-the-counter antifungals with no success. Denies any pedal injuries or trauma. Allergies: Patient has no known allergies. Current Outpatient Medications   Medication Sig Dispense Refill    terbinafine 250 MG Oral Tab Take 1 tablet (250 mg total) by mouth daily. 90 tablet 0    docusate sodium 100 MG Oral Cap Take 100 mg by mouth 2 (two) times daily. NO PRESCRIPTION NEEDED (Patient not taking: Reported on 2021 ) 30 capsule 0    ibuprofen 600 MG Oral Tab Take 1 tablet (600 mg total) by mouth every 6 (six) hours. (Patient not taking: Reported on 2021 ) 20 tablet 0    Ferrous Sulfate 325 (65 Fe) MG Oral Tab Take 325 mg by mouth. Prenat-Fe Poly-Methfol-FA-DHA (VITAFOL ULTRA) 29-0.6-0.4-200 MG Oral Cap Take 1 tablet by mouth.         Past Medical History:   Diagnosis Date    Abnormal antibody titer     abnormal antibody in pregnancy    Abnormal uterine bleeding     heavy bleeding due to endometriosis    Amenorrhea     Irregular periods    Chicken pox     childhood    Closed Colles' fracture     Diabetes mellitus (HCC)     Gestational diabetes with 1st pregnancy    Dysmenorrhea     Endometriosis     Gestational diabetes     Hyperlipidemia     Infertility, female     Did a frozen embryo transfer    Partial rectal prolapse     partial rectal prolapse after vaginal delivery    Preeclampsia     On the day she delivered her twins by      Transfusion history     PP      Past Surgical History:   Procedure Laterality Date          LAPAROSCOPY,PELVIC,BIOPSY      OTHER SURGICAL HISTORY      fracture left wrist surgery       History reviewed. No pertinent family history. Social History    Socioeconomic History      Marital status:       Spouse name: Tavia Perez      Number of children: 3      Years of education: 25    Occupational History      Occupation: Homemaker    Tobacco Use      Smoking status: Never      Smokeless tobacco: Never    Vaping Use      Vaping Use: Never used    Substance and Sexual Activity      Alcohol use: Never      Drug use: Never    Other Topics      Concerns:        Blood Transfusions: No        Caffeine Concern: No        Occupational Exposure: No        Special Diet: Yes          vegetarian        Exercise: No        Seat Belt: Yes          REVIEW OF SYSTEMS:     Denies nause, fever, chills  No calf pain  Denies chest pain or SOB      EXAM:   LMP 05/01/2023 (Approximate)   GENERAL: well developed, well nourished, in no apparent distress  EXTREMITIES:   1. Integument: Normal skin temperature and turgor. Toenails 1 through 5 of the right foot are thickened, discolored with subungual debris  2. Vascular: Dorsalis pedis two out of four bilateral and posterior tibial pulses two out of   four bilateral, capillary refill normal.   3. Musculoskeletal: All muscle groups are graded 5 out of 5 in the foot and ankle. 4. Neurological: Normal sharp dull sensation; reflexes normal.             ASSESSMENT AND PLAN:   Diagnoses and all orders for this visit:    Onychomycosis  -     HEPATIC FUNCTION PANEL (7); Future    Other orders  -     terbinafine 250 MG Oral Tab; Take 1 tablet (250 mg total) by mouth daily. Plan:     Reviewed treatment options for nail dystrophy / onychomycosis including:   No treatment and monitoring, topical meds, oral meds, nail removal and laser treatment. Advantages and disadvantages of each reviewed.  Using oral agents would require regular   blood test monitoring due to risk of hepatotoxicity and other adverse reactions including drug interactions. Topical meds are much safer yet carry very low efficacy. Pt has opted for oral antifungal at this time. RTC in 45 days for repeat hepatic labs     The patient indicates understanding of these issues and agrees to the plan.         Fidencio Raymundo DPM

## 2023-09-27 DIAGNOSIS — B35.1 ONYCHOMYCOSIS: Primary | ICD-10-CM

## 2023-10-04 ENCOUNTER — LAB ENCOUNTER (OUTPATIENT)
Dept: LAB | Age: 38
End: 2023-10-04
Attending: STUDENT IN AN ORGANIZED HEALTH CARE EDUCATION/TRAINING PROGRAM
Payer: MEDICAID

## 2023-10-04 DIAGNOSIS — B35.1 ONYCHOMYCOSIS: ICD-10-CM

## 2023-10-04 LAB
ALBUMIN SERPL-MCNC: 4.6 G/DL (ref 3.4–5)
ALP LIVER SERPL-CCNC: 38 U/L
ALT SERPL-CCNC: 25 U/L
AST SERPL-CCNC: 10 U/L (ref 15–37)
BILIRUB DIRECT SERPL-MCNC: 0.2 MG/DL (ref 0–0.2)
BILIRUB SERPL-MCNC: 0.8 MG/DL (ref 0.1–2)
PROT SERPL-MCNC: 7.8 G/DL (ref 6.4–8.2)

## 2023-10-04 PROCEDURE — 80076 HEPATIC FUNCTION PANEL: CPT

## 2023-11-07 RX ORDER — TERBINAFINE HYDROCHLORIDE 250 MG/1
250 TABLET ORAL DAILY
Qty: 90 TABLET | Refills: 0 | OUTPATIENT
Start: 2023-11-07 | End: 2024-02-05

## 2024-01-31 RX ORDER — TERBINAFINE HYDROCHLORIDE 250 MG/1
250 TABLET ORAL DAILY
Qty: 90 TABLET | Refills: 0 | OUTPATIENT
Start: 2024-01-31

## 2024-01-31 NOTE — TELEPHONE ENCOUNTER
Last seen on 9/11/23  Last rx given on 9/11/23 #90 no refill  Refill not needed.   Please also refer to 11/7/23 TE

## 2024-07-23 ENCOUNTER — LAB ENCOUNTER (OUTPATIENT)
Dept: LAB | Age: 39
End: 2024-07-23
Attending: OBSTETRICS & GYNECOLOGY
Payer: MEDICAID

## 2024-07-23 ENCOUNTER — OFFICE VISIT (OUTPATIENT)
Dept: OBGYN CLINIC | Facility: CLINIC | Age: 39
End: 2024-07-23

## 2024-07-23 VITALS
BODY MASS INDEX: 19.96 KG/M2 | WEIGHT: 116.88 LBS | HEIGHT: 64 IN | SYSTOLIC BLOOD PRESSURE: 120 MMHG | DIASTOLIC BLOOD PRESSURE: 72 MMHG

## 2024-07-23 DIAGNOSIS — Z13.220 SCREENING, LIPID: ICD-10-CM

## 2024-07-23 DIAGNOSIS — Z13.1 SCREENING FOR DIABETES MELLITUS (DM): ICD-10-CM

## 2024-07-23 DIAGNOSIS — Z12.4 ENCOUNTER FOR PAPANICOLAOU SMEAR FOR CERVICAL CANCER SCREENING: ICD-10-CM

## 2024-07-23 DIAGNOSIS — Z13.0 SCREENING, IRON DEFICIENCY ANEMIA: ICD-10-CM

## 2024-07-23 DIAGNOSIS — Z13.29 THYROID DISORDER SCREENING: ICD-10-CM

## 2024-07-23 DIAGNOSIS — Z00.00 ENCOUNTER FOR ANNUAL PHYSICAL EXAM: Primary | ICD-10-CM

## 2024-07-23 LAB
BASOPHILS # BLD AUTO: 0.05 X10(3) UL (ref 0–0.2)
BASOPHILS NFR BLD AUTO: 0.6 %
EOSINOPHIL # BLD AUTO: 0.12 X10(3) UL (ref 0–0.7)
EOSINOPHIL NFR BLD AUTO: 1.3 %
ERYTHROCYTE [DISTWIDTH] IN BLOOD BY AUTOMATED COUNT: 12.5 %
HCT VFR BLD AUTO: 36.5 %
HGB BLD-MCNC: 12.2 G/DL
IMM GRANULOCYTES # BLD AUTO: 0.03 X10(3) UL (ref 0–1)
IMM GRANULOCYTES NFR BLD: 0.3 %
LYMPHOCYTES # BLD AUTO: 1.83 X10(3) UL (ref 1–4)
LYMPHOCYTES NFR BLD AUTO: 20.4 %
MCH RBC QN AUTO: 27.5 PG (ref 26–34)
MCHC RBC AUTO-ENTMCNC: 33.4 G/DL (ref 31–37)
MCV RBC AUTO: 82.2 FL
MONOCYTES # BLD AUTO: 0.42 X10(3) UL (ref 0.1–1)
MONOCYTES NFR BLD AUTO: 4.7 %
NEUTROPHILS # BLD AUTO: 6.5 X10 (3) UL (ref 1.5–7.7)
NEUTROPHILS # BLD AUTO: 6.5 X10(3) UL (ref 1.5–7.7)
NEUTROPHILS NFR BLD AUTO: 72.7 %
PLATELET # BLD AUTO: 245 10(3)UL (ref 150–450)
RBC # BLD AUTO: 4.44 X10(6)UL
WBC # BLD AUTO: 9 X10(3) UL (ref 4–11)

## 2024-07-23 PROCEDURE — 85025 COMPLETE CBC W/AUTO DIFF WBC: CPT

## 2024-07-23 PROCEDURE — 82728 ASSAY OF FERRITIN: CPT

## 2024-07-23 PROCEDURE — 80061 LIPID PANEL: CPT

## 2024-07-23 PROCEDURE — 80053 COMPREHEN METABOLIC PANEL: CPT

## 2024-07-23 PROCEDURE — 84443 ASSAY THYROID STIM HORMONE: CPT

## 2024-07-23 PROCEDURE — 99385 PREV VISIT NEW AGE 18-39: CPT | Performed by: OBSTETRICS & GYNECOLOGY

## 2024-07-23 NOTE — PROGRESS NOTES
[facility]  Obstetrics and Gynecology  Gynecology Visit    Chief Complaint   Patient presents with    Annual           Brandi GLADIS Villalobos is a 39 year old female who presents for annual exam.    LMP: 2024.    Menses regular: yes.    Menstrual flow normal: yes.    Birth control or HRT:  0.   Refill 0  Last Pap Smear: .  Any history of abnormal paps: No hx abn   Last MMG: n/a  Any Medication Refills needed today?: no  Sleep: 7-8 hours.    Diet: balanced.    Exercise: daily.   Screening labs/Blood work today: no.     Colonoscopy (if over 44 y/o): n/a.   Gardasil:(age 9-44 y/o) n/a.   Genetic Cancer screen (if indicated): no.   Flu (Aug-April): n/a.TDAP (every 10 years) up to date.      Additional Problems/concerns: No concerns.      Next Appt: will call    Immunization History   Administered Date(s) Administered    TDAP 2020         Current Outpatient Medications:     docusate sodium 100 MG Oral Cap, Take 100 mg by mouth 2 (two) times daily. NO PRESCRIPTION NEEDED (Patient not taking: Reported on 2021 ), Disp: 30 capsule, Rfl: 0    ibuprofen 600 MG Oral Tab, Take 1 tablet (600 mg total) by mouth every 6 (six) hours. (Patient not taking: Reported on 2021 ), Disp: 20 tablet, Rfl: 0    Ferrous Sulfate 325 (65 Fe) MG Oral Tab, Take 325 mg by mouth., Disp: , Rfl:     Prenat-Fe Poly-Methfol-FA-DHA (VITAFOL ULTRA) 29-0.6-0.4-200 MG Oral Cap, Take 1 tablet by mouth., Disp: , Rfl:     No Known Allergies    OB History    Para Term  AB Living   4 3 2 1 1 4   SAB IAB Ectopic Multiple Live Births   1 0 0 1 4      # Outcome Date GA Lbr Carlton/2nd Weight Sex Type Anes PTL Lv   4 Term 20 39w1d 08:38 / 00:03 8 lb 7.1 oz (3.83 kg) M VAGINAL ROBINSON EPI, Local N HAYLIE      Birth Comments: AB pos, bryce pos - Anti C antibodies  Hyperbilirubinemia - treated with phototherapy x 48 hours, discontinued on   Normal H and H and Retic  Time of delivery: 1:41pm  Mother's age: 32 years Maternal blood  type: A Positive   Hearing Test: Passed Bilateral  CCHD: Passed  TCB 4.10 2020 0355    BILT 15.0 (H) 2020 0610      Complications: Variable decelerations      Name: CRYSTAL VILLALOBOS      Apgar1: 8  Apgar5: 9   3 Term 18 39w3d 03:39 / 02:41 8 lb 10 oz (3.912 kg) F Vag-Spont EPI N HAYLIE      Birth Comments: hemorrhage occurred immediately after delivery      Complications: Hemorrhage, Other Excessive Bleeding, Prolonged spontaneous rupture of membranes (HCC)      Name: Marcelina       Apgar1: 9  Apgar5: 9   2 SAB 17 8w0d   U       1A  11/10/14 36w4d  6 lb 10 oz (3.005 kg) M CS-Unspec  Y HAYLIE      Complications: Preeclampsia, Gestational diabetes mellitus (GDM) (HCC), Pre-eclampsia (HCC)      Name: Coretta   1B  11/10/14 36w4d  5 lb 11 oz (2.58 kg) F CS-Unspec  Y HAYLIE      Complications: Preeclampsia, Gestational diabetes mellitus (GDM) (HCC), Pre-eclampsia (HCC)      Name: Asif       Past Medical History:    Abnormal antibody titer    abnormal antibody in pregnancy    Abnormal uterine bleeding    heavy bleeding due to endometriosis    Amenorrhea    Irregular periods    Chicken pox    childhood    Closed Colles' fracture    Diabetes mellitus (HCC)    Gestational diabetes with 1st pregnancy    Dysmenorrhea    Endometriosis    Gestational diabetes (HCC)    Hyperlipidemia    Infertility, female    Did a frozen embryo transfer    Partial rectal prolapse    partial rectal prolapse after vaginal delivery    Preeclampsia (HCC)    On the day she delivered her twins by      Transfusion history    PP       Past Surgical History:   Procedure Laterality Date          Laparoscopy,pelvic,biopsy      Other surgical history      fracture left wrist surgery        No family history on file.     Tobacco  Allergies  Soc Hx        Social History     Socioeconomic History    Marital status:      Spouse name: Cy Villalobos    Number of children: 3    Years of education: 18    Highest  education level: Not on file   Occupational History    Occupation: Homemaker   Tobacco Use    Smoking status: Never     Passive exposure: Never    Smokeless tobacco: Never   Vaping Use    Vaping status: Never Used   Substance and Sexual Activity    Alcohol use: Never    Drug use: Never    Sexual activity: Yes     Partners: Male   Other Topics Concern     Service Not Asked    Blood Transfusions No    Caffeine Concern No    Occupational Exposure No    Hobby Hazards Not Asked    Sleep Concern Not Asked    Stress Concern Not Asked    Weight Concern Not Asked    Special Diet Yes     Comment: vegetarian    Back Care Not Asked    Exercise No    Bike Helmet Not Asked    Seat Belt Yes    Self-Exams Not Asked   Social History Narrative    Not on file     Social Determinants of Health     Financial Resource Strain: Not on file   Food Insecurity: Not on file   Transportation Needs: Not on file   Physical Activity: Not on file   Stress: Not on file   Social Connections: Unknown (3/13/2021)    [facility]    Social Connections     Conversations with friends/family/neighbors per week: Not on file   Housing Stability: Low Risk  (7/6/2021)    [facility]    Housing Stability     Mortgage Payment Concerns?: Not on file     Number of Places Lived in the Last Year: Not on file     Unstable Housing?: Not on file     /72   Ht 5' 4\" (1.626 m)   Wt 116 lb 13.5 oz (53 kg)   LMP 07/14/2024   BMI 20.06 kg/m²     Wt Readings from Last 3 Encounters:   07/23/24 116 lb 13.5 oz (53 kg)   02/20/20 149 lb (67.6 kg)   02/11/20 147 lb 9.6 oz (67 kg)         Health Maintenance   Topic Date Due    Influenza Vaccine (1) 08/01/2021    Screen for Cervical Cancer 11/05/2021    DTaP,Tdap and Td Vaccines (3 - Td or Tdap) 03/18/2025    Hepatitis C Screening Completed    HIV Screening Completed    COVID-19 Vaccine  Completed     Review of Systems   General: Present- Feeling well. Not Present- Chills, Fever, Weight Gain and Weight Loss.  HEENT: Not Present- Headache and Sore Throat.  Respiratory: Not Present- Cough, Difficulty Breathing, Hemoptysis and Sputum Production.  Cardiovascular: Not Present- Chest Pain, Elevated Blood Pressure, Fainting / Blacking Out and Shortness of Breath.  Gastrointestinal: Not Present- Constipation, Diarrhea, Nausea and Vomiting.  Female Genitourinary: Not Present- Discharge, Dysuria and Frequency.  Musculoskeletal: Not Present- Leg Cramps and Swelling of Extremities.  Neurological: Not Present- Dizziness and Headaches.  Psychiatric: Not Present- Anxiety and Depression.  Endocrine: Not Present- Appetite Changes, Hair Changes and Thyroid Problems.  Hematology: Not Present- Easy Bruising and Excessive bleeding.  All other systems negative     Physical Exam The physical exam findings are as follows:     General   Mental Status - Alert. General Appearance - Cooperative. Orientation - Oriented X4. Build & Nutrition - Well nourished.    Head and Neck  Thyroid   Gland Characteristics - normal size and consistency.    Chest and Lung Exam   Inspection:   Chest Wall: - Normal.  Percussion:   Quality and Intensity: - Percussion normal.  Palpation: - Palpation normal.  Auscultation:   Breath sounds: - Normal.  Adventitious sounds: - No Adventitious sounds.    Breast   Nipples: Characteristics - Bilateral - Normal. Discharge - Bilateral - None.  Breast - Bilateral - Symmetric.    Cardiovascular   Auscultation: Rhythm - Regular. Heart Sounds - Normal heart sounds.  Murmurs & Other Heart Sounds: Auscultation of the heart reveals - No Murmurs.    Abdomen   Inspection: Inspection of the abdomen reveals - No Hernias. Incisional scars - no incisional scars.  Palpation/Percussion: Palpation and Percussion of the abdomen reveal - Non Tender and No Palpable abdominal masses.  Liver: - Normal.  Auscultation:  Auscultation of the abdomen reveals - Bowel sounds normal.    Female Genitourinary     External Genitalia   Perineum - Normal. Bartholin's Gland - Bilateral - Normal. Clitoris - Normal.  Introitus: Characteristics - No Cystocele, Enterocele or Rectocele. Discharge - None.  Labia Majora: Lesions - Bilateral - None. Characteristics - Bilateral - Normal.  Labia Minora: Lesions - Bilateral - None. Characteristics - Bilateral - Normal.  Urethra: Characteristics - Normal. Discharge - None.  Crestwood Gland - Bilateral - Normal.  Vulva: Characteristics - Normal. Lesions - None.    Speculum & Bimanual   Vagina:   Vaginal Wall: - Normal.  Vaginal Lesions - None. Vaginal Mucosa - Normal.  Cervix: Characteristics - No Motion tenderness. Discharge - None.  Uterus: Characteristics - Normal. Position - Midposition.  Adnexa: Characteristics - Bilateral - Normal. Masses - No Adnexal Masses.  Wet Mount: pH - 3.8-4.2. Vaginal discharge - Clear  and Thin. Amine Odor - Absent. Main patient complaints - Discharge.      Rectal   Anorectal Exam: External - normal external exam.    Peripheral Vascular   Upper Extremity:   Palpation: - Pulses bilaterally normal.  Lower Extremity: Inspection - Bilateral - Inspection Normal.  Palpation: Edema - Bilateral - No edema.    Neurologic   Mental Status: - Normal.    Lymphatic  General Lymphatics   Description - Normal .       1. Encounter for annual physical exam

## 2024-07-24 LAB
ALBUMIN SERPL-MCNC: 4.6 G/DL (ref 3.2–4.8)
ALBUMIN/GLOB SERPL: 1.8 {RATIO} (ref 1–2)
ALP LIVER SERPL-CCNC: 50 U/L
ALT SERPL-CCNC: 16 U/L
ANION GAP SERPL CALC-SCNC: 6 MMOL/L (ref 0–18)
AST SERPL-CCNC: 16 U/L (ref ?–34)
BILIRUB SERPL-MCNC: 1.1 MG/DL (ref 0.3–1.2)
BUN BLD-MCNC: 9 MG/DL (ref 9–23)
CALCIUM BLD-MCNC: 9.6 MG/DL (ref 8.7–10.4)
CHLORIDE SERPL-SCNC: 102 MMOL/L (ref 98–112)
CHOLEST SERPL-MCNC: 144 MG/DL (ref ?–200)
CO2 SERPL-SCNC: 30 MMOL/L (ref 21–32)
CREAT BLD-MCNC: 0.75 MG/DL
DEPRECATED HBV CORE AB SER IA-ACNC: 11.4 NG/ML
EGFRCR SERPLBLD CKD-EPI 2021: 105 ML/MIN/1.73M2 (ref 60–?)
FASTING PATIENT LIPID ANSWER: NO
FASTING STATUS PATIENT QL REPORTED: NO
GLOBULIN PLAS-MCNC: 2.5 G/DL (ref 2.8–4.4)
GLUCOSE BLD-MCNC: 176 MG/DL (ref 70–99)
HDLC SERPL-MCNC: 64 MG/DL (ref 40–59)
LDLC SERPL CALC-MCNC: 67 MG/DL (ref ?–100)
NONHDLC SERPL-MCNC: 80 MG/DL (ref ?–130)
OSMOLALITY SERPL CALC.SUM OF ELEC: 289 MOSM/KG (ref 275–295)
POTASSIUM SERPL-SCNC: 3.6 MMOL/L (ref 3.5–5.1)
PROT SERPL-MCNC: 7.1 G/DL (ref 5.7–8.2)
SODIUM SERPL-SCNC: 138 MMOL/L (ref 136–145)
TRIGL SERPL-MCNC: 62 MG/DL (ref 30–149)
TSI SER-ACNC: 1.73 MIU/ML (ref 0.55–4.78)
VLDLC SERPL CALC-MCNC: 9 MG/DL (ref 0–30)

## 2024-07-25 NOTE — PROGRESS NOTES
Low ferritin - start OTC iron every other day   Glucose elevated Plan HbA1c and fasting glucose next year.     Fabi Moya MD

## 2024-07-26 LAB
.: NORMAL
.: NORMAL

## 2024-07-29 LAB — HPV E6+E7 MRNA CVX QL NAA+PROBE: NEGATIVE

## (undated) NOTE — LETTER
VACCINE ADMINISTRATION RECORD  PARENT / GUARDIAN APPROVAL  Date: 2020  Vaccine administered to: Vangie Rico     : 1985    MRN: [id number]    A copy of the appropriate Centers for Disease Control and Prevention Vaccine Information statemen

## (undated) NOTE — LETTER
INSTRUCTIONS FOR PRE-SURGICAL   ANTIMICROBIAL BATH/SHOWER    Your doctor has recommended a pre-surgical CHG (chlorhexidine gluconate) shower/bath with Betasept (also sold as Hibiclens). It reduces bacteria that can potentially cause infection.   Betasept Keep out of reach of children. If swallowed get medica help or contact [facility]. Store between 60-80 degrees F. Fabric Warning! CHG WILL STAIN YOUR FABRICS! Use with care around shower curtains, towels washcloths rugs and clothes.   Wipe

## (undated) NOTE — LETTER
The [facility]  Scheduling the Birth of Your Rob Martínez    You are scheduled for a  delivery on 20 at 1:30 pm.  You should arrive at the [facility] at 11:30 am.      Please follow the enclosed antimicrobial wash instru